# Patient Record
Sex: FEMALE | Race: WHITE | Employment: FULL TIME | URBAN - METROPOLITAN AREA
[De-identification: names, ages, dates, MRNs, and addresses within clinical notes are randomized per-mention and may not be internally consistent; named-entity substitution may affect disease eponyms.]

---

## 2018-07-02 ENCOUNTER — ANESTHESIA EVENT (OUTPATIENT)
Dept: MEDSURG UNIT | Age: 38
End: 2018-07-02
Payer: SELF-PAY

## 2018-07-02 NOTE — H&P
Nisreen Mail  : 1980  DOS: 2018  Chief Complaint: Consultation       Allergies: penicillin, Allegra, Benadryl       Medications: lisinopril, methocarbamol, miSOPROStol, montelukast, Claritin 10 mg oral tablet, venlafaxine, Synthroid, Iron 100 Plus oral tablet, biotin, Vitamin X74, folic acid, Maginex, PriLOSEC       Medical History: Height: 5' 8\", Weight: 142 lbs    Pulmonary System: Negative  Cardiac System: Hypertension  Blood and Liver Systems: Negative  Neurologic and Endocrine Systems: Thyroid Trouble  GI,  and Reproductive Systems: Negative  Cancer: Negative  Other: Arthritis     Surgical History: Gastric Bypass   x2  Cholecystectomy       Family History: Depression , Stroke , High Blood Pressure , Heart Disease       Social History: Smoking Status: Current every day smoker       Mrs. Scout Paniagua is a pleasant 81-year-old female who presents today for a consultation regarding her desire to undergo elective body contouring surgery to address changes that have occurred to her body from a history of obesity and laparoscopic gastric bypass surgery and marked weight loss. Her greatest weight is approximately 290 pounds. She underwent a laparoscopic gastric bypass in 2016 while living in [place]. She currently is down to approximately 140 pounds having lost 150 pounds. As a result of her marked weight loss she has seen changes that occurred throughout most of her body. She is interested today however in addressing the changes to her circumferential trunk. She is interested in a more youthful contour and profile of her anterior abdomen, her sides and her back and buttocks. Her children are ages 15 and 10 both delivered by . She has been pregnant 3 additional times all resulting in a miscarriage. She also has a history of a DVT following a knee operation and was treated briefly with Lovenox. Review of systems reveals normal heart and lung sounds. Examination demonstrates soft tissue tone and skin tone on her circumferential trunk consistent with a bariatric weight loss patient. She has laparoscopic scars consistent with her gastric bypass. She has an overhanging fold or pannus in the front with suprapubic mons fullness and soft tissue laxity that extends laterally over the greater trochanter and the hip region to the buttocks and lumbar region. She has buttock ptosis and poor tissue tone posteriorly. She also demonstrates evidence of scoliosis curving to her left of the chest wall and the right of the pelvis. I had a lengthy discussion with Mrs. Renny Patricia regarding post-bariatric body contouring to address her concerns. I believe she is a candidate for circumferential/belt abdominoplasty. She understands this is performed under a general anesthetic on an outpatient basis. I diagrammed on paper and her trunk where the incisions are made and she understands patients begin in the prone position. An incision is made across the lower lumbar region just at the apex of the vertical gluteal cleft and a large caudally based skin flap is elevated off of the gluteal fascia. A de-epithelialized Island of dermis and fat can be preserved on its vasculature and rotated down and sutured to the muscle fascia to provide added volume. Additional skin and soft tissue is excised and removed and the large flap is pulled in a cephalic direction and the large wound is closed in layers with dissolvable sutures. Suction drains are left in place and patients are then rotated to the supine position. The anterior component is completed similarly to a standard abdominoplasty with a transverse lower abdominal incision, an effort to contour the suprapubic mons region, and elevation to the costal margins and the xiphoid. The anterior rectus fascia is plicated and tightened with Prolene sutures.   Patients are then flexed at the waist and the excess skin and fat is incised and removed. Anteriorly the complex wound is closed in layers with dissolvable sutures and suction drains are also left in place. A compression garment is used with associated activity restrictions while she is healing which can extend beyond 6 weeks. There is no exercise for 4 weeks. The risks and complications include but are not limited to infection, pain, bleeding, hematomas requiring re-operation, skin sensitivity changes, vascular compromise to tissues resulting in partial or complete loss of tissues, resultant open wounds, the need for long term wound care and dressing changes and scarring, irregularities and asymmetries requiring touch-up and revision surgery, an unacceptable cosmetic result, and other things including cardiac and pulmonary risks that include death. In addition, her history of DVT following a knee operation would prompt me to treat her with Lovenox in the immediate postoperative period. Her questions were answered and pictures were taken. She will meet with Breann Kemp to discuss the fees.

## 2018-07-03 ENCOUNTER — HOSPITAL ENCOUNTER (OUTPATIENT)
Age: 38
Setting detail: OUTPATIENT SURGERY
Discharge: HOME | End: 2018-07-03
Attending: SURGERY | Admitting: SURGERY
Payer: SELF-PAY

## 2018-07-03 ENCOUNTER — ANESTHESIA (OUTPATIENT)
Dept: MEDSURG UNIT | Age: 38
End: 2018-07-03
Payer: SELF-PAY

## 2018-07-03 VITALS
TEMPERATURE: 97.4 F | BODY MASS INDEX: 21.95 KG/M2 | OXYGEN SATURATION: 100 % | WEIGHT: 144.84 LBS | HEART RATE: 92 BPM | HEIGHT: 68 IN | DIASTOLIC BLOOD PRESSURE: 80 MMHG | RESPIRATION RATE: 17 BRPM | SYSTOLIC BLOOD PRESSURE: 102 MMHG

## 2018-07-03 PROBLEM — Z86.39 HISTORY OF MORBID OBESITY: Status: ACTIVE | Noted: 2018-07-03

## 2018-07-03 PROBLEM — Z41.1 ENCOUNTER FOR COSMETIC SURGERY: Status: ACTIVE | Noted: 2018-07-03

## 2018-07-03 PROBLEM — R63.4 EXCESSIVE WEIGHT LOSS: Status: ACTIVE | Noted: 2018-07-03

## 2018-07-03 LAB
HCG UR QL: NEGATIVE
HGB BLD-MCNC: 12.7 G/DL (ref 11.5–16)

## 2018-07-03 PROCEDURE — 76030000011 HC AMB SURG OR TIME 5.5 TO 6: Performed by: SURGERY

## 2018-07-03 PROCEDURE — 74011250636 HC RX REV CODE- 250/636: Performed by: ANESTHESIOLOGY

## 2018-07-03 PROCEDURE — 74011250636 HC RX REV CODE- 250/636

## 2018-07-03 PROCEDURE — 77030031139 HC SUT VCRL2 J&J -A: Performed by: SURGERY

## 2018-07-03 PROCEDURE — 77030027138 HC INCENT SPIROMETER -A

## 2018-07-03 PROCEDURE — 77030018719 HC DRSG PTCH ANTIMIC J&J -A: Performed by: SURGERY

## 2018-07-03 PROCEDURE — 74011250636 HC RX REV CODE- 250/636: Performed by: SURGERY

## 2018-07-03 PROCEDURE — 74011000250 HC RX REV CODE- 250

## 2018-07-03 PROCEDURE — 77030013567 HC DRN WND RESERV BARD -A: Performed by: SURGERY

## 2018-07-03 PROCEDURE — 77030018846 HC SOL IRR STRL H20 ICUM -A: Performed by: SURGERY

## 2018-07-03 PROCEDURE — 77030020782 HC GWN BAIR PAWS FLX 3M -B

## 2018-07-03 PROCEDURE — 76210000038 HC AMBSU PH I REC 2.5 TO 3 HR: Performed by: SURGERY

## 2018-07-03 PROCEDURE — 77030011264 HC ELECTRD BLD EXT COVD -A: Performed by: SURGERY

## 2018-07-03 PROCEDURE — 77030020061 HC IV BLD WRMR ADMIN SET 3M -B: Performed by: ANESTHESIOLOGY

## 2018-07-03 PROCEDURE — 77030002966 HC SUT PDS J&J -A: Performed by: SURGERY

## 2018-07-03 PROCEDURE — 76060000072 HC AMB SURG ANES 5.5 TO 6 HR: Performed by: SURGERY

## 2018-07-03 PROCEDURE — 74011250637 HC RX REV CODE- 250/637

## 2018-07-03 PROCEDURE — 85018 HEMOGLOBIN: CPT

## 2018-07-03 PROCEDURE — 77030026438 HC STYL ET INTUB CARD -A: Performed by: ANESTHESIOLOGY

## 2018-07-03 PROCEDURE — 77030003666 HC NDL SPINAL BD -A: Performed by: SURGERY

## 2018-07-03 PROCEDURE — 77030018836 HC SOL IRR NACL ICUM -A: Performed by: SURGERY

## 2018-07-03 PROCEDURE — 77030002986 HC SUT PROL J&J -A: Performed by: SURGERY

## 2018-07-03 PROCEDURE — 74011000250 HC RX REV CODE- 250: Performed by: SURGERY

## 2018-07-03 PROCEDURE — 77030002996 HC SUT SLK J&J -A: Performed by: SURGERY

## 2018-07-03 PROCEDURE — 77030011640 HC PAD GRND REM COVD -A: Performed by: SURGERY

## 2018-07-03 PROCEDURE — 77030019908 HC STETH ESOPH SIMS -A: Performed by: ANESTHESIOLOGY

## 2018-07-03 PROCEDURE — 77030039267 HC ADH SKN EXOFIN S2SG -B: Performed by: SURGERY

## 2018-07-03 PROCEDURE — 81025 URINE PREGNANCY TEST: CPT

## 2018-07-03 PROCEDURE — 74011250637 HC RX REV CODE- 250/637: Performed by: ANESTHESIOLOGY

## 2018-07-03 PROCEDURE — 77030034850: Performed by: SURGERY

## 2018-07-03 PROCEDURE — 77030011265 HC ELECTRD BLD HEX COVD -A: Performed by: SURGERY

## 2018-07-03 PROCEDURE — C9290 INJ, BUPIVACAINE LIPOSOME: HCPCS | Performed by: SURGERY

## 2018-07-03 PROCEDURE — 77030032490 HC SLV COMPR SCD KNE COVD -B: Performed by: SURGERY

## 2018-07-03 PROCEDURE — 77030008684 HC TU ET CUF COVD -B: Performed by: ANESTHESIOLOGY

## 2018-07-03 PROCEDURE — 77030008467 HC STPLR SKN COVD -B: Performed by: SURGERY

## 2018-07-03 PROCEDURE — 77030002933 HC SUT MCRYL J&J -A: Performed by: SURGERY

## 2018-07-03 PROCEDURE — 77030012407 HC DRN WND BARD -B: Performed by: SURGERY

## 2018-07-03 PROCEDURE — 77030019702 HC WRP THER MENM -C: Performed by: SURGERY

## 2018-07-03 RX ORDER — FENTANYL CITRATE 50 UG/ML
50 INJECTION, SOLUTION INTRAMUSCULAR; INTRAVENOUS AS NEEDED
Status: DISCONTINUED | OUTPATIENT
Start: 2018-07-03 | End: 2018-07-03 | Stop reason: HOSPADM

## 2018-07-03 RX ORDER — SODIUM CHLORIDE, SODIUM LACTATE, POTASSIUM CHLORIDE, CALCIUM CHLORIDE 600; 310; 30; 20 MG/100ML; MG/100ML; MG/100ML; MG/100ML
INJECTION, SOLUTION INTRAVENOUS
Status: DISCONTINUED | OUTPATIENT
Start: 2018-07-03 | End: 2018-07-03 | Stop reason: HOSPADM

## 2018-07-03 RX ORDER — FENTANYL CITRATE 50 UG/ML
25 INJECTION, SOLUTION INTRAMUSCULAR; INTRAVENOUS
Status: DISCONTINUED | OUTPATIENT
Start: 2018-07-03 | End: 2018-07-03 | Stop reason: HOSPADM

## 2018-07-03 RX ORDER — SODIUM CHLORIDE, SODIUM LACTATE, POTASSIUM CHLORIDE, CALCIUM CHLORIDE 600; 310; 30; 20 MG/100ML; MG/100ML; MG/100ML; MG/100ML
125 INJECTION, SOLUTION INTRAVENOUS CONTINUOUS
Status: DISCONTINUED | OUTPATIENT
Start: 2018-07-03 | End: 2018-07-03 | Stop reason: HOSPADM

## 2018-07-03 RX ORDER — ONDANSETRON 2 MG/ML
INJECTION INTRAMUSCULAR; INTRAVENOUS AS NEEDED
Status: DISCONTINUED | OUTPATIENT
Start: 2018-07-03 | End: 2018-07-03 | Stop reason: HOSPADM

## 2018-07-03 RX ORDER — LEVOTHYROXINE SODIUM 100 UG/1
TABLET ORAL
COMMUNITY

## 2018-07-03 RX ORDER — SODIUM CHLORIDE 0.9 % (FLUSH) 0.9 %
5-10 SYRINGE (ML) INJECTION AS NEEDED
Status: DISCONTINUED | OUTPATIENT
Start: 2018-07-03 | End: 2018-07-03 | Stop reason: SDUPTHER

## 2018-07-03 RX ORDER — MORPHINE SULFATE 10 MG/ML
2 INJECTION, SOLUTION INTRAMUSCULAR; INTRAVENOUS
Status: DISCONTINUED | OUTPATIENT
Start: 2018-07-03 | End: 2018-07-03 | Stop reason: HOSPADM

## 2018-07-03 RX ORDER — SCOLOPAMINE TRANSDERMAL SYSTEM 1 MG/1
PATCH, EXTENDED RELEASE TRANSDERMAL AS NEEDED
Status: DISCONTINUED | OUTPATIENT
Start: 2018-07-03 | End: 2018-07-03 | Stop reason: HOSPADM

## 2018-07-03 RX ORDER — SODIUM CHLORIDE 0.9 % (FLUSH) 0.9 %
5-10 SYRINGE (ML) INJECTION EVERY 8 HOURS
Status: DISCONTINUED | OUTPATIENT
Start: 2018-07-03 | End: 2018-07-03 | Stop reason: SDUPTHER

## 2018-07-03 RX ORDER — SODIUM CHLORIDE 0.9 % (FLUSH) 0.9 %
5-10 SYRINGE (ML) INJECTION AS NEEDED
Status: DISCONTINUED | OUTPATIENT
Start: 2018-07-03 | End: 2018-07-03 | Stop reason: HOSPADM

## 2018-07-03 RX ORDER — OXYCODONE HYDROCHLORIDE 5 MG/1
5 TABLET ORAL AS NEEDED
Status: DISCONTINUED | OUTPATIENT
Start: 2018-07-03 | End: 2018-07-03 | Stop reason: HOSPADM

## 2018-07-03 RX ORDER — HYDROMORPHONE HYDROCHLORIDE 2 MG/ML
INJECTION, SOLUTION INTRAMUSCULAR; INTRAVENOUS; SUBCUTANEOUS AS NEEDED
Status: DISCONTINUED | OUTPATIENT
Start: 2018-07-03 | End: 2018-07-03 | Stop reason: HOSPADM

## 2018-07-03 RX ORDER — LISINOPRIL 5 MG/1
5 TABLET ORAL DAILY
COMMUNITY
End: 2019-09-06

## 2018-07-03 RX ORDER — NEOSTIGMINE METHYLSULFATE 1 MG/ML
INJECTION INTRAVENOUS AS NEEDED
Status: DISCONTINUED | OUTPATIENT
Start: 2018-07-03 | End: 2018-07-03 | Stop reason: HOSPADM

## 2018-07-03 RX ORDER — VENLAFAXINE HYDROCHLORIDE 75 MG/1
75 CAPSULE, EXTENDED RELEASE ORAL
COMMUNITY

## 2018-07-03 RX ORDER — LIDOCAINE HYDROCHLORIDE 20 MG/ML
INJECTION, SOLUTION EPIDURAL; INFILTRATION; INTRACAUDAL; PERINEURAL AS NEEDED
Status: DISCONTINUED | OUTPATIENT
Start: 2018-07-03 | End: 2018-07-03 | Stop reason: HOSPADM

## 2018-07-03 RX ORDER — CEFAZOLIN SODIUM 1 G/3ML
INJECTION, POWDER, FOR SOLUTION INTRAMUSCULAR; INTRAVENOUS AS NEEDED
Status: DISCONTINUED | OUTPATIENT
Start: 2018-07-03 | End: 2018-07-03 | Stop reason: HOSPADM

## 2018-07-03 RX ORDER — LANOLIN ALCOHOL/MO/W.PET/CERES
CREAM (GRAM) TOPICAL
COMMUNITY
End: 2019-09-06

## 2018-07-03 RX ORDER — MIDAZOLAM HYDROCHLORIDE 1 MG/ML
1 INJECTION, SOLUTION INTRAMUSCULAR; INTRAVENOUS
Status: DISCONTINUED | OUTPATIENT
Start: 2018-07-03 | End: 2018-07-03 | Stop reason: HOSPADM

## 2018-07-03 RX ORDER — MIDAZOLAM HYDROCHLORIDE 1 MG/ML
0.5 INJECTION, SOLUTION INTRAMUSCULAR; INTRAVENOUS
Status: DISCONTINUED | OUTPATIENT
Start: 2018-07-03 | End: 2018-07-03 | Stop reason: HOSPADM

## 2018-07-03 RX ORDER — FENTANYL CITRATE 50 UG/ML
INJECTION, SOLUTION INTRAMUSCULAR; INTRAVENOUS AS NEEDED
Status: DISCONTINUED | OUTPATIENT
Start: 2018-07-03 | End: 2018-07-03 | Stop reason: HOSPADM

## 2018-07-03 RX ORDER — ONDANSETRON 2 MG/ML
4 INJECTION INTRAMUSCULAR; INTRAVENOUS AS NEEDED
Status: DISCONTINUED | OUTPATIENT
Start: 2018-07-03 | End: 2018-07-03 | Stop reason: HOSPADM

## 2018-07-03 RX ORDER — BUPIVACAINE HYDROCHLORIDE 2.5 MG/ML
30 INJECTION, SOLUTION EPIDURAL; INFILTRATION; INTRACAUDAL ONCE
Status: COMPLETED | OUTPATIENT
Start: 2018-07-03 | End: 2018-07-03

## 2018-07-03 RX ORDER — LIDOCAINE HYDROCHLORIDE 10 MG/ML
0.5 INJECTION, SOLUTION EPIDURAL; INFILTRATION; INTRACAUDAL; PERINEURAL AS NEEDED
Status: DISCONTINUED | OUTPATIENT
Start: 2018-07-03 | End: 2018-07-03 | Stop reason: HOSPADM

## 2018-07-03 RX ORDER — OMEPRAZOLE 10 MG/1
20 CAPSULE, DELAYED RELEASE ORAL DAILY
COMMUNITY
End: 2020-06-12

## 2018-07-03 RX ORDER — LORATADINE 10 MG/1
10 TABLET ORAL
COMMUNITY
End: 2019-09-06

## 2018-07-03 RX ORDER — GLUCOSAMINE/CHONDR SU A SOD 750-600 MG
10000 TABLET ORAL
COMMUNITY
End: 2019-09-06

## 2018-07-03 RX ORDER — MIDAZOLAM HYDROCHLORIDE 1 MG/ML
INJECTION, SOLUTION INTRAMUSCULAR; INTRAVENOUS AS NEEDED
Status: DISCONTINUED | OUTPATIENT
Start: 2018-07-03 | End: 2018-07-03 | Stop reason: HOSPADM

## 2018-07-03 RX ORDER — OXYCODONE HYDROCHLORIDE 5 MG/1
5 TABLET ORAL ONCE
Status: COMPLETED | OUTPATIENT
Start: 2018-07-03 | End: 2018-07-03

## 2018-07-03 RX ORDER — MIDAZOLAM HYDROCHLORIDE 1 MG/ML
1 INJECTION, SOLUTION INTRAMUSCULAR; INTRAVENOUS AS NEEDED
Status: DISCONTINUED | OUTPATIENT
Start: 2018-07-03 | End: 2018-07-03 | Stop reason: SDUPTHER

## 2018-07-03 RX ORDER — PHENYLEPHRINE HCL IN 0.9% NACL 0.4MG/10ML
SYRINGE (ML) INTRAVENOUS AS NEEDED
Status: DISCONTINUED | OUTPATIENT
Start: 2018-07-03 | End: 2018-07-03 | Stop reason: HOSPADM

## 2018-07-03 RX ORDER — OXYCODONE AND ACETAMINOPHEN 5; 325 MG/1; MG/1
1 TABLET ORAL AS NEEDED
Status: DISCONTINUED | OUTPATIENT
Start: 2018-07-03 | End: 2018-07-03 | Stop reason: HOSPADM

## 2018-07-03 RX ORDER — ENOXAPARIN SODIUM 100 MG/ML
30 INJECTION SUBCUTANEOUS ONCE
Status: COMPLETED | OUTPATIENT
Start: 2018-07-03 | End: 2018-07-03

## 2018-07-03 RX ORDER — SUCCINYLCHOLINE CHLORIDE 20 MG/ML
INJECTION INTRAMUSCULAR; INTRAVENOUS AS NEEDED
Status: DISCONTINUED | OUTPATIENT
Start: 2018-07-03 | End: 2018-07-03 | Stop reason: HOSPADM

## 2018-07-03 RX ORDER — ROCURONIUM BROMIDE 10 MG/ML
INJECTION, SOLUTION INTRAVENOUS AS NEEDED
Status: DISCONTINUED | OUTPATIENT
Start: 2018-07-03 | End: 2018-07-03 | Stop reason: HOSPADM

## 2018-07-03 RX ORDER — PROPOFOL 10 MG/ML
INJECTION, EMULSION INTRAVENOUS AS NEEDED
Status: DISCONTINUED | OUTPATIENT
Start: 2018-07-03 | End: 2018-07-03 | Stop reason: HOSPADM

## 2018-07-03 RX ORDER — DEXTROSE, SODIUM CHLORIDE, SODIUM LACTATE, POTASSIUM CHLORIDE, AND CALCIUM CHLORIDE 5; .6; .31; .03; .02 G/100ML; G/100ML; G/100ML; G/100ML; G/100ML
125 INJECTION, SOLUTION INTRAVENOUS CONTINUOUS
Status: DISCONTINUED | OUTPATIENT
Start: 2018-07-03 | End: 2018-07-03 | Stop reason: HOSPADM

## 2018-07-03 RX ORDER — ZINC GLUCONATE 10 MG
LOZENGE ORAL
COMMUNITY
End: 2019-09-06

## 2018-07-03 RX ORDER — GLYCOPYRROLATE 0.2 MG/ML
INJECTION INTRAMUSCULAR; INTRAVENOUS AS NEEDED
Status: DISCONTINUED | OUTPATIENT
Start: 2018-07-03 | End: 2018-07-03 | Stop reason: HOSPADM

## 2018-07-03 RX ORDER — FENTANYL CITRATE 50 UG/ML
50 INJECTION, SOLUTION INTRAMUSCULAR; INTRAVENOUS AS NEEDED
Status: DISCONTINUED | OUTPATIENT
Start: 2018-07-03 | End: 2018-07-03 | Stop reason: SDUPTHER

## 2018-07-03 RX ORDER — SODIUM CHLORIDE 0.9 % (FLUSH) 0.9 %
5-10 SYRINGE (ML) INJECTION EVERY 8 HOURS
Status: DISCONTINUED | OUTPATIENT
Start: 2018-07-03 | End: 2018-07-03 | Stop reason: HOSPADM

## 2018-07-03 RX ORDER — MIDAZOLAM HYDROCHLORIDE 1 MG/ML
1 INJECTION, SOLUTION INTRAMUSCULAR; INTRAVENOUS AS NEEDED
Status: DISCONTINUED | OUTPATIENT
Start: 2018-07-03 | End: 2018-07-03 | Stop reason: HOSPADM

## 2018-07-03 RX ORDER — LANOLIN ALCOHOL/MO/W.PET/CERES
2500 CREAM (GRAM) TOPICAL DAILY
COMMUNITY
End: 2020-12-09 | Stop reason: ALTCHOICE

## 2018-07-03 RX ORDER — DEXAMETHASONE SODIUM PHOSPHATE 4 MG/ML
INJECTION, SOLUTION INTRA-ARTICULAR; INTRALESIONAL; INTRAMUSCULAR; INTRAVENOUS; SOFT TISSUE AS NEEDED
Status: DISCONTINUED | OUTPATIENT
Start: 2018-07-03 | End: 2018-07-03 | Stop reason: HOSPADM

## 2018-07-03 RX ORDER — SODIUM CHLORIDE 9 MG/ML
25 INJECTION, SOLUTION INTRAVENOUS CONTINUOUS
Status: DISCONTINUED | OUTPATIENT
Start: 2018-07-03 | End: 2018-07-03 | Stop reason: HOSPADM

## 2018-07-03 RX ORDER — CLINDAMYCIN PHOSPHATE 900 MG/50ML
900 INJECTION INTRAVENOUS ONCE
Status: DISCONTINUED | OUTPATIENT
Start: 2018-07-03 | End: 2018-07-03 | Stop reason: HOSPADM

## 2018-07-03 RX ORDER — SODIUM CHLORIDE, SODIUM LACTATE, POTASSIUM CHLORIDE, CALCIUM CHLORIDE 600; 310; 30; 20 MG/100ML; MG/100ML; MG/100ML; MG/100ML
125 INJECTION, SOLUTION INTRAVENOUS CONTINUOUS
Status: DISCONTINUED | OUTPATIENT
Start: 2018-07-03 | End: 2018-07-03 | Stop reason: SDUPTHER

## 2018-07-03 RX ORDER — ACETAMINOPHEN 10 MG/ML
INJECTION, SOLUTION INTRAVENOUS AS NEEDED
Status: DISCONTINUED | OUTPATIENT
Start: 2018-07-03 | End: 2018-07-03 | Stop reason: HOSPADM

## 2018-07-03 RX ORDER — FOLIC ACID 1 MG/1
TABLET ORAL DAILY
COMMUNITY
End: 2020-06-12

## 2018-07-03 RX ORDER — LIDOCAINE HYDROCHLORIDE 10 MG/ML
0.1 INJECTION, SOLUTION EPIDURAL; INFILTRATION; INTRACAUDAL; PERINEURAL AS NEEDED
Status: DISCONTINUED | OUTPATIENT
Start: 2018-07-03 | End: 2018-07-03 | Stop reason: SDUPTHER

## 2018-07-03 RX ORDER — MONTELUKAST SODIUM 10 MG/1
10 TABLET ORAL DAILY
COMMUNITY
End: 2019-09-06

## 2018-07-03 RX ORDER — DIPHENHYDRAMINE HYDROCHLORIDE 50 MG/ML
12.5 INJECTION, SOLUTION INTRAMUSCULAR; INTRAVENOUS AS NEEDED
Status: DISCONTINUED | OUTPATIENT
Start: 2018-07-03 | End: 2018-07-03 | Stop reason: HOSPADM

## 2018-07-03 RX ADMIN — CEFAZOLIN SODIUM 1 G: 1 INJECTION, POWDER, FOR SOLUTION INTRAMUSCULAR; INTRAVENOUS at 16:56

## 2018-07-03 RX ADMIN — FENTANYL CITRATE 100 MCG: 50 INJECTION, SOLUTION INTRAMUSCULAR; INTRAVENOUS at 14:42

## 2018-07-03 RX ADMIN — ROCURONIUM BROMIDE 20 MG: 10 INJECTION, SOLUTION INTRAVENOUS at 14:08

## 2018-07-03 RX ADMIN — NEOSTIGMINE METHYLSULFATE 3 MG: 1 INJECTION INTRAVENOUS at 16:23

## 2018-07-03 RX ADMIN — SODIUM CHLORIDE, SODIUM LACTATE, POTASSIUM CHLORIDE, AND CALCIUM CHLORIDE 125 ML/HR: 600; 310; 30; 20 INJECTION, SOLUTION INTRAVENOUS at 10:30

## 2018-07-03 RX ADMIN — Medication 80 MCG: at 15:25

## 2018-07-03 RX ADMIN — FENTANYL CITRATE 25 MCG: 50 INJECTION, SOLUTION INTRAMUSCULAR; INTRAVENOUS at 17:48

## 2018-07-03 RX ADMIN — SODIUM CHLORIDE, SODIUM LACTATE, POTASSIUM CHLORIDE, CALCIUM CHLORIDE: 600; 310; 30; 20 INJECTION, SOLUTION INTRAVENOUS at 10:56

## 2018-07-03 RX ADMIN — ROCURONIUM BROMIDE 20 MG: 10 INJECTION, SOLUTION INTRAVENOUS at 14:34

## 2018-07-03 RX ADMIN — ROCURONIUM BROMIDE 20 MG: 10 INJECTION, SOLUTION INTRAVENOUS at 13:08

## 2018-07-03 RX ADMIN — SCOLOPAMINE TRANSDERMAL SYSTEM 1 PATCH: 1 PATCH, EXTENDED RELEASE TRANSDERMAL at 10:55

## 2018-07-03 RX ADMIN — DEXAMETHASONE SODIUM PHOSPHATE 4 MG: 4 INJECTION, SOLUTION INTRA-ARTICULAR; INTRALESIONAL; INTRAMUSCULAR; INTRAVENOUS; SOFT TISSUE at 14:04

## 2018-07-03 RX ADMIN — SUCCINYLCHOLINE CHLORIDE 120 MG: 20 INJECTION INTRAMUSCULAR; INTRAVENOUS at 11:06

## 2018-07-03 RX ADMIN — ENOXAPARIN SODIUM 30 MG: 30 INJECTION SUBCUTANEOUS at 17:30

## 2018-07-03 RX ADMIN — MIDAZOLAM HYDROCHLORIDE 2 MG: 1 INJECTION, SOLUTION INTRAMUSCULAR; INTRAVENOUS at 10:59

## 2018-07-03 RX ADMIN — CLINDAMYCIN PHOSPHATE 900 MG: 18 INJECTION, SOLUTION INTRAMUSCULAR; INTRAVENOUS at 11:15

## 2018-07-03 RX ADMIN — NEOSTIGMINE METHYLSULFATE 1 MG: 1 INJECTION INTRAVENOUS at 16:25

## 2018-07-03 RX ADMIN — ACETAMINOPHEN 1000 MG: 10 INJECTION, SOLUTION INTRAVENOUS at 11:15

## 2018-07-03 RX ADMIN — Medication 40 MCG: at 12:13

## 2018-07-03 RX ADMIN — Medication 80 MCG: at 12:15

## 2018-07-03 RX ADMIN — SODIUM CHLORIDE, SODIUM LACTATE, POTASSIUM CHLORIDE, CALCIUM CHLORIDE: 600; 310; 30; 20 INJECTION, SOLUTION INTRAVENOUS at 16:18

## 2018-07-03 RX ADMIN — PROPOFOL 200 MG: 10 INJECTION, EMULSION INTRAVENOUS at 11:06

## 2018-07-03 RX ADMIN — GLYCOPYRROLATE 0.4 MG: 0.2 INJECTION INTRAMUSCULAR; INTRAVENOUS at 16:23

## 2018-07-03 RX ADMIN — SODIUM CHLORIDE, SODIUM LACTATE, POTASSIUM CHLORIDE, AND CALCIUM CHLORIDE 125 ML/HR: 600; 310; 30; 20 INJECTION, SOLUTION INTRAVENOUS at 17:51

## 2018-07-03 RX ADMIN — GLYCOPYRROLATE 0.2 MG: 0.2 INJECTION INTRAMUSCULAR; INTRAVENOUS at 15:33

## 2018-07-03 RX ADMIN — FENTANYL CITRATE 75 MCG: 50 INJECTION, SOLUTION INTRAMUSCULAR; INTRAVENOUS at 11:12

## 2018-07-03 RX ADMIN — DEXAMETHASONE SODIUM PHOSPHATE 4 MG: 4 INJECTION, SOLUTION INTRA-ARTICULAR; INTRALESIONAL; INTRAMUSCULAR; INTRAVENOUS; SOFT TISSUE at 11:15

## 2018-07-03 RX ADMIN — FENTANYL CITRATE 50 MCG: 50 INJECTION, SOLUTION INTRAMUSCULAR; INTRAVENOUS at 11:06

## 2018-07-03 RX ADMIN — HYDROMORPHONE HYDROCHLORIDE 0.5 MG: 2 INJECTION, SOLUTION INTRAMUSCULAR; INTRAVENOUS; SUBCUTANEOUS at 16:50

## 2018-07-03 RX ADMIN — OXYCODONE HYDROCHLORIDE 5 MG: 5 TABLET ORAL at 18:51

## 2018-07-03 RX ADMIN — ROCURONIUM BROMIDE 10 MG: 10 INJECTION, SOLUTION INTRAVENOUS at 11:06

## 2018-07-03 RX ADMIN — LIDOCAINE HYDROCHLORIDE 100 MG: 20 INJECTION, SOLUTION EPIDURAL; INFILTRATION; INTRACAUDAL; PERINEURAL at 11:06

## 2018-07-03 RX ADMIN — ONDANSETRON 4 MG: 2 INJECTION INTRAMUSCULAR; INTRAVENOUS at 16:17

## 2018-07-03 RX ADMIN — Medication 80 MCG: at 13:25

## 2018-07-03 RX ADMIN — ROCURONIUM BROMIDE 30 MG: 10 INJECTION, SOLUTION INTRAVENOUS at 11:10

## 2018-07-03 RX ADMIN — FENTANYL CITRATE 75 MCG: 50 INJECTION, SOLUTION INTRAMUSCULAR; INTRAVENOUS at 12:44

## 2018-07-03 RX ADMIN — ROCURONIUM BROMIDE 40 MG: 10 INJECTION, SOLUTION INTRAVENOUS at 12:08

## 2018-07-03 NOTE — PERIOP NOTES
1755:   liter of LR in via warmer since admit to PACU. Color improving, urine output picking up. Lovenox 30 mg given per Dr Emili Riley in outer left thigh deep subq.

## 2018-07-03 NOTE — INTERVAL H&P NOTE
H&P Update:  Cuco Burgos was seen and examined. History and physical has been reviewed. The patient has been examined.  There have been no significant clinical changes since the completion of the originally dated History and Physical.    Signed By: Zo Nation MD     July 3, 2018 6:14 AM

## 2018-07-03 NOTE — OP NOTES
[address] [place] [address] REPORT    Darren Mac  MR#: [id number]  : 1980  ACCOUNT #: [de-identified]   DATE OF SERVICE: 2018    PREOPERATIVE DIAGNOSIS:  Personal history of morbid obesity, status post marked weight loss desires elective body contouring surgery. POSTOPERATIVE DIAGNOSIS:  Personal history of morbid obesity, status post marked weight loss desires elective body contouring surgery. PROCEDURES PERFORMED:  Circumferential/belt abdominoplasty with autologous augmentation to the buttocks. SURGEON:  Saleem Cordon MD     ANESTHESIA:  General.    INDICATION FOR PROCEDURE:  The patient is a pleasant 69-year-old female seen in the [address] [place] [address] with a desire to undergo elective body contouring surgery following marked weight loss. She gives a history of morbid obesity and was as heavy as 290 pounds. She underwent a laparoscopic Binta-en-Y gastric bypass in 2016 in [facility]. She has lost 150 pounds and is now down to approximately 140 pounds. As a result of her marked weight loss, she has seen significant changes and poor tissue tone and laxity to her circumferential trunk. She was felt to be an excellent candidate to treat these post-bariatric body issues by means of a circumferential/belt abdominoplasty, and she comes in today for that procedure. She does have a history of a DVT following knee operation and was treated briefly with Lovenox. We plan to treat her in the postoperative period with Lovenox prior to discharge. OPERATIVE PROCEDURE:  After appropriate consent was obtained, preoperative markings were placed. She was taken to the operating room and placed on the operating table in supine position. Satisfactory general endotracheal anesthesia was obtained. SCD devices were placed per routine and a Paul catheter was placed.   She was then rotated and placed in the prone position with appropriate padding and support and her posterior trunk and buttocks were sterilely prepped and draped. Based on our preoperative markings, the lower incision was made with a #10 scalpel blade beginning on the patient's right side and then electrocautery was used to dissect through the subcutaneous tissue to the gluteal fascia. We then dissected in a caudal direction towards the transverse gluteal fold on top of the gluteal fascia. We then pulled the skin flap in a cephalic direction to ensure wound closure prior to commitment to the upper incision. Once we were satisfied, the upper incision was made again down to the subcutaneous fat with the electrocautery. A 13 cm round island of deepithelialized dermis and subcutaneous fat left attached to the gluteal vasculature was then loosened from the surrounding connections, and rotated down to the appropriate position and sutured onto the gluteal fascia as for autologous augmentation. This was performed with 0 PDS sutures interrupted simple fashion. At the completion of the autologous augmentation, the skin flap was then pulled in a cephalad direction of both sides and the wound was closed in a complex fashion, 3 layers, reapproximating the deep fascia with a 0 PDS, the superficial fascia and dermis with a 2-0 PDS, and the skin with a 2-0 Monocryl. A 15-Citizen of [place] round fluted drain was brought out laterally on the upper thigh and secured with a 3-0 silk suture. A sterile dressing was placed on the posterior incision. The drapes were removed and she was placed back to a transfer gurney in supine position, and then back on the operative table in the supine position with her arms externally rotated and abducted on padded arm boards. The anterior trunk was sterilely prepped and draped and we completed the anterior component of the belt abdominoplasty communicating the incisions from the lateral upper thigh across the suprapubic mons region.   We then dissected in a cephalad direction to the xiphoid in the midline and the costal margins bilaterally. At this point, the abdominal rectus fascia was then plicated in standard fashion with 0 Prolene suture in 2 layers running fashion from the symphysis pubis to the umbilicus and 2 layers from the umbilicus to the xiphoid. The total with the plication at the level of the umbilicus was approximately 6.5 cm on either side of the midline. At this point, 0.25% Marcaine plain x30 mL was directly injected into the anterior rectus fascia to aid in postop pain management. The patient was then flexed at the waist to identify the amount of skin to be removed. It was marked with a marking pen and inspected for symmetry, incised with a #10 scalpel blade and removed with the electrocautery. Two additional 15-[place] round fluted drains were brought out on the upper lateral thigh, placed in the anterior abdominal wound, also secured with 3-0 silk suture. The abdominal wound was closed in 3 layers reapproximating Katie fascia with 2-0 PDS, the dermis with 3-0 Vicryl, and the skin with a 3-0 Monocryl. The umbilicus was brought out through its new position and secured in 2 layers with 3-0 Vicryl and 4-0 Monocryl suture. Sterile dressings and a compression garment binder were placed. At the end of the procedure, sponge and needle counts were reported as correct. ESTIMATED BLOOD LOSS:  250 mL. IV FLUIDS:  3000 mL. URINE OUTPUT IN THE NULL:  250 mL. SPECIMENS REMOVED:  Skin posterior trunk 489 g, discarded. Anterior trunk 812 g, discarded. IMPLANTS:  None. DRAINS:  15-[place] round fluted drains x4. ASSISTANT:  Crystal Jolley and [facility] staff. OR staff room 3. COMPLICATIONS:  None. She was awakened, extubated, and transferred to the recovery room in satisfactory and stable condition. She will be discharged home today in the care of her family with prescriptions and instructions and a followup with me within 1 week.       Candy Swanson MD Julian Boateng  D: 07/03/2018 17:05     T: 07/03/2018 17:39  JOB #: [id number]  CC: Elisa Matias MD

## 2018-07-03 NOTE — IP AVS SNAPSHOT
[address] [facility] [address] 
[phone] Patient: Anel Good MRN: [id number] [id number] About your hospitalization You were admitted on:  July 3, 2018 You last received care in the:  [facility] PACU You were discharged on:  July 3, 2018 Why you were hospitalized Your primary diagnosis was:  Encounter For Cosmetic Surgery Your diagnoses also included:  History Of Morbid Obesity, Excessive Weight Loss Follow-up Information Follow up With Details Comments Contact Info Sebas Solorio MD Follow up in 1 week(s)  [facility] [address] 
[phone] Zbigniew Persaud, MD   Patient can only remember the practice name and not the physician Discharge Orders None A check isis indicates which time of day the medication should be taken. My Medications CONTINUE taking these medications Instructions Each Dose to Equal  
 Morning Noon Evening Bedtime Biotin 2,500 mcg Cap Your last dose was: Your next dose is: Take 10,000 mcg by mouth. 39026 mcg CLARITIN 10 mg tablet Generic drug:  loratadine Your last dose was: Your next dose is: Take 10 mg by mouth. 10 mg  
    
   
   
   
  
 cyanocobalamin 1,000 mcg tablet Your last dose was: Your next dose is: Take 2,500 mcg by mouth daily. 2500 mcg EFFEXOR XR 75 mg capsule Generic drug:  venlafaxine-SR Your last dose was: Your next dose is: Take 75 mg by mouth daily. 75 mg  
    
   
   
   
  
 folic acid 1 mg tablet Commonly known as:  [employer] Your last dose was: Your next dose is: Take  by mouth daily. Iron 325 mg (65 mg iron) tablet Generic drug:  ferrous sulfate Your last dose was:     
   
Your next dose is: Take  by mouth Daily (before breakfast). lisinopril 5 mg tablet Commonly known as:  Vivien Frias Your last dose was: Your next dose is: Take 5 mg by mouth daily. 5 mg  
    
   
   
   
  
 magnesium 250 mg Tab Your last dose was: Your next dose is: Take  by mouth. OTHER Your last dose was: Your next dose is:    
   
   
 misoprostol PriLOSEC 10 mg capsule Generic drug:  omeprazole Your last dose was: Your next dose is: Take 10 mg by mouth daily. 10 mg  
    
   
   
   
  
 SINGULAIR 10 mg tablet Generic drug:  montelukast  
   
Your last dose was: Your next dose is: Take 10 mg by mouth daily. 10 mg SYNTHROID 100 mcg tablet Generic drug:  levothyroxine Your last dose was: Your next dose is: Take  by mouth Daily (before breakfast). Discharge Instructions Leave surgical garment and dressings ON and DRY for 48 hours, then may remove for shower. Resume any and all pre op medications and diet BUT use sport drinks like gator aid or power aid instead of water for 2-3 days and extra protein in diet helps wounds heal. 
Do not take pain medications on an empty stomach Keep head elevated at night at least 35 degrees, do not lay flat for about 2 weeks Walk every 1-2 hours during the day in house for 5-10 minutes for first 2 weeks Use the incentive spirometer every hour at least 5 times for first 2 weeks to help prevent respiratory problems NO direct sitting on butt for first 2 days except on the toilet, and then limit sitting to one hour for first 2 weeks You may slump sit like in a recliner chair, lay in the fetal position on your sides, and stand or walk. Use stool softeners to prevent constipation Clair Dc drains (4) measure and record daily output, recharge as needed, strip tubes 2-3 times per day When you shower in 48 hours, remove the garment and suspend the drains around your neck with a piece of string, yarn, or a lanyard, Shower as you normally would, dry your skin and place antibiotic ointment of choice over all incisions with clean gauze, and place the second clean binder on as you found the first one. Keep the drain tubes coming out the bottom of the binder component instead of out the top. May repeat daily and launder the garments in between use. Call Dr. Louis Hendricks on the cell number of [phone] Anticipate a phone call tonight from Dr. Louis Hendricks Scopolamine (Absorbed through the skin) Scopolamine ([id number]) Treat nausea and vomiting. Brand Name(s): Transderm Scop There may be other brand names for this medicine. When This Medicine Should Not Be Used: You should not use this medicine if you have had an allergic reaction to scopolamine, or if you have narrow angle glaucoma. How to Use This Medicine:  
Patch · If the patch is loose or falls off, apply a new patch at a different place behind the ear. · After you take off the patch, wash the place where the patch was and your hands thoroughly. · Only one patch should be used at any time. How to Store and Dispose of This Medicine: · Store the patches at room temperature in a closed container, away from heat, moisture, and direct light. · Fold the used patch in half with the sticky sides together. Throw any used patch away so that children or pets cannot get to it. You will also need to throw away old patches after the expiration date has passed. · Keep all medicine out of the reach of children. Never share your medicine with anyone. Drugs and Foods to Avoid: Ask your doctor or pharmacist before using any other medicine, including over-the-counter medicines, vitamins, and herbal products.  
· Tell your doctor if you use anything else that makes you sleepy. Some examples are allergy medicine, narcotic pain medicine, and alcohol. · Do not drink alcohol while you are using this medicine. Warnings While Using This Medicine: · Make sure your doctor knows if you are pregnant or breastfeeding, or if you have glaucoma, prostate problems, trouble urinating, blocked bowels, liver disease, kidney disease, or a history of seizures or mental illness. · This medicine can cause blurring of vision and other vision problems if it comes in contact with the eyes. This medicine may also cause problems with urination. If any of these reactions occur, remove the patch and call your doctor right away. · This medicine may make you dizzy or drowsy. Avoid driving, using machines, or doing anything else that could be dangerous if you are not alert. If you plan to participate in underwater sports, this medicine may cause disorienting effects. If this is a concern for you, talk with your doctor. · This medicine may make you sweat less and cause your body to get too hot. Be careful in hot weather, when you are exercising, or if using a sauna or whirlpool. · Tell any doctor or dentist who treats you that you are using this medicine. This medicine may affect certain medical test results. · Skin burns have been reported at the patch site in several patients wearing an aluminized transdermal system during a magnetic resonance imaging scan (MRI). Because Transderm Sc?p® contains aluminum, it is recommended to remove the system before undergoing an MRI. Possible Side Effects While Using This Medicine:  
Call your doctor right away if you notice any of these side effects: · Allergic reaction: Itching or hives, swelling in your face or hands, swelling or tingling in your mouth or throat, chest tightness, trouble breathing · Blurred vision. · Confusion or memory loss. · Fast, slow, or uneven heartbeat.  
· Lightheadedness, dizziness, drowsiness, or fainting. · Seeing, hearing, or feeling things that are not there. · Severe eye pain. · Trouble urinating. If you notice these less serious side effects, talk with your doctor: · Dry mouth. · Dry, itchy, or red eyes. · Restlessness. · Skin rash or redness. If you notice other side effects that you think are caused by this medicine, tell your doctor. DISCHARGE SUMMARY from Nurse PATIENT INSTRUCTIONS: 
 
 
Read insert on Experell After general anesthesia or intravenous sedation, for 24 hours or while taking prescription Narcotics: · Limit your activities · Do not drive and operate hazardous machinery · Do not make important personal or business decisions · Do  not drink alcoholic beverages · If you have not urinated within 8 hours after discharge, please contact your surgeon on call. Report the following to your surgeon: 
· Excessive pain, swelling, redness or odor of or around the surgical area · Temperature over 100.5 · Nausea and vomiting lasting longer than 4 hours or if unable to take medications · Any signs of decreased circulation or nerve impairment to extremity: change in color, persistent  numbness, tingling, coldness or increase pain · Any questions What to do at Home: 
Recommended activity: Activity as tolerated and no driving for today. If you experience any of the symptoms, please follow up with Dr Gerardo Mcgovern. *  Please give a list of your current medications to your Primary Care Provider. *  Please update this list whenever your medications are discontinued, doses are 
    changed, or new medications (including over-the-counter products) are added. *  Please carry medication information at all times in case of emergency situations. These are general instructions for a healthy lifestyle: No smoking/ No tobacco products/ Avoid exposure to second hand smoke Surgeon General's Warning:  Quitting smoking now greatly reduces serious risk to your health. Obesity, smoking, and sedentary lifestyle greatly increases your risk for illness A healthy diet, regular physical exercise & weight monitoring are important for maintaining a healthy lifestyle You may be retaining fluid if you have a history of heart failure or if you experience any of the following symptoms:  Weight gain of 3 pounds or more overnight or 5 pounds in a week, increased swelling in our hands or feet or shortness of breath while lying flat in bed. Please call your doctor as soon as you notice any of these symptoms; do not wait until your next office visit. Recognize signs and symptoms of STROKE: 
 
F-face looks uneven A-arms unable to move or move unevenly S-speech slurred or non-existent T-time-call 911 as soon as signs and symptoms begin-DO NOT go Back to bed or wait to see if you get better-TIME IS BRAIN. Warning Signs of HEART ATTACK Call 911 if you have these symptoms:  Chest discomfort. Most heart attacks involve discomfort in the center of the chest that lasts more than a few minutes, or that goes away and comes back. It can feel like uncomfortable pressure, squeezing, fullness, or pain.  Discomfort in other areas of the upper body. Symptoms can include pain or discomfort in one or both arms, the back, neck, jaw, or stomach.  Shortness of breath with or without chest discomfort.  Other signs may include breaking out in a cold sweat, nausea, or lightheadedness. Don't wait more than five minutes to call [address]! Fast action can save your life. Calling 911 is almost always the fastest way to get lifesaving treatment. Emergency Medical Services staff can begin treatment when they arrive  up to an hour sooner than if someone gets to the hospital by car. The discharge information has been reviewed with the patient and spouse. The patient and spouse verbalized understanding.  
Discharge medications reviewed with the patient and spouse and appropriate educational materials and side effects teaching were provided. ___________________________________________________________________________________________________________________________________ Introducing [facility] [place] [facility]! Alvarado Jones introduces Viewpoint patient portal. Now you can access parts of your medical record, email your doctor's office, and request medication refills online. 1. In your internet browser, go to [email or web address] 2. Click on the First Time User? Click Here link in the Sign In box. You will see the New Member Sign Up page. 3. Enter your Viewpoint Access Code exactly as it appears below. You will not need to use this code after youve completed the sign-up process. If you do not sign up before the expiration date, you must request a new code. · Viewpoint Access Code: [email or web address] Expires: 9/27/2018  1:24 PM 
 
4. Enter the last four digits of your Social Security Number (xxxx) and Date of Birth (mm/dd/yyyy) as indicated and click Submit. You will be taken to the next sign-up page. 5. Create a IVDeskt ID. This will be your Viewpoint login ID and cannot be changed, so think of one that is secure and easy to remember. 6. Create a Viewpoint password. You can change your password at any time. 7. Enter your Password Reset Question and Answer. This can be used at a later time if you forget your password. 8. Enter your e-mail address. You will receive e-mail notification when new information is available in [address] 9. Click Sign Up. You can now view and download portions of your medical record. 10. Click the Download Summary menu link to download a portable copy of your medical information. If you have questions, please visit the Frequently Asked Questions section of the Viewpoint website. Remember, Viewpoint is NOT to be used for urgent needs. For medical emergencies, dial 911. Now available from your iPhone and Android! Introducing Dion Booker As a [facility] patient, I wanted to make you aware of our electronic visit tool called Dion Booker. its learning allows you to connect within minutes with a medical provider 24 hours a day, seven days a week via a mobile device or tablet or logging into a secure website from your computer. You can access Dion Booker from anywhere in the [place]. A virtual visit might be right for you when you have a simple condition and feel like you just dont want to get out of bed, or cant get away from work for an appointment, when your regular [facility] provider is not available (evenings, weekends or holidays), or when youre out of town and need minor care. Electronic visits cost only $49 and if the [employer] provider determines a prescription is needed to treat your condition, one can be electronically transmitted to a nearby pharmacy*. Please take a moment to enroll today if you have not already done so. The enrollment process is free and takes just a few minutes. To enroll, please download the its learning adrianna to your tablet or phone, or visit [email or web address] to enroll on your computer. And, as an [address] [place] [address] patient with a [employer] account, the results of your visits will be scanned into your electronic medical record and your primary care provider will be able to view the scanned results. We urge you to continue to see your regular [facility] provider for your ongoing medical care. And while your primary care provider may not be the one available when you seek a Dion Booker virtual visit, the peace of mind you get from getting a real diagnosis real time can be priceless. For more information on Dion Booker, view our Frequently Asked Questions (FAQs) at [email or web address]. Sincerely, 
 
Kimberlyn Martins MD 
Chief Medical Officer [employer] *:  certain medications cannot be prescribed via Dion Booker Providers Seen During Your Hospitalization Provider Specialty Primary office phone [facility]  Surgery [phone] Your Primary Care Physician (PCP) Primary Care Physician Office Phone Office Fax OTHER, PHYS ** None ** ** None ** You are allergic to the following Allergen Reactions Allegra (Fexofenadine) Shortness of Breath Amoxicillin Other (comments) Yeast infection Benadryl (Diphenhydramine Hcl) Shortness of Breath Recent Documentation Height Weight BMI OB Status Smoking Status 1.727 m 65.7 kg 22.02 kg/m2 IUD Current Every Day Smoker Emergency Contacts Name Discharge Info Relation Home Work Mobile [address] CAREGIVER [3] Spouse [3]   [phone] Patient Belongings The following personal items are in your possession at time of discharge: 
  Dental Appliances: None                Clothing:  (clothes in locker valuable to ) Please provide this summary of care documentation to your next provider. Signatures-by signing, you are acknowledging that this After Visit Summary has been reviewed with you and you have received a copy. Patient Signature:  ____________________________________________________________ Date:  ____________________________________________________________  
  
Teresita Jones Provider Signature:  ____________________________________________________________ Date:  ____________________________________________________________

## 2018-07-03 NOTE — DISCHARGE INSTRUCTIONS
Leave surgical garment and dressings ON and DRY for 48 hours, then may remove for shower. Resume any and all pre op medications and diet BUT use sport drinks like gator aid or power aid instead of water for 2-3 days and extra protein in diet helps wounds heal.  Do not take pain medications on an empty stomach  Keep head elevated at night at least 35 degrees, do not lay flat for about 2 weeks  Walk every 1-2 hours during the day in house for 5-10 minutes for first 2 weeks  Use the incentive spirometer every hour at least 5 times for first 2 weeks to help prevent respiratory problems  NO direct sitting on butt for first 2 days except on the toilet, and then limit sitting to one hour for first 2 weeks  You may slump sit like in a recliner chair, lay in the fetal position on your sides, and stand or walk. Use stool softeners to prevent constipation  MELISSA drains (4) measure and record daily output, recharge as needed, strip tubes 2-3 times per day  When you shower in 48 hours, remove the garment and suspend the drains around your neck with a piece of string, yarn, or a lanyard,  Shower as you normally would, dry your skin and place antibiotic ointment of choice over all incisions with clean gauze, and place the second clean binder on as you found the first one. Keep the drain tubes coming out the bottom of the binder component instead of out the top. May repeat daily and launder the garments in between use. Call Dr. Alexia Metz on the cell number of [phone]  Anticipate a phone call tonight from Dr. Alexia Metz    Scopolamine (Absorbed through the skin)   Scopolamine ([id number])  Treat nausea and vomiting. Brand Name(s): Transderm Scop   There may be other brand names for this medicine. When This Medicine Should Not Be Used: You should not use this medicine if you have had an allergic reaction to scopolamine, or if you have narrow angle glaucoma.   How to Use This Medicine:   Patch    · If the patch is loose or falls off, apply a new patch at a different place behind the ear. · After you take off the patch, wash the place where the patch was and your hands thoroughly. · Only one patch should be used at any time. How to Store and Dispose of This Medicine:   · Store the patches at room temperature in a closed container, away from heat, moisture, and direct light. · Fold the used patch in half with the sticky sides together. Throw any used patch away so that children or pets cannot get to it. You will also need to throw away old patches after the expiration date has passed. · Keep all medicine out of the reach of children. Never share your medicine with anyone. Drugs and Foods to Avoid:   Ask your doctor or pharmacist before using any other medicine, including over-the-counter medicines, vitamins, and herbal products. · Tell your doctor if you use anything else that makes you sleepy. Some examples are allergy medicine, narcotic pain medicine, and alcohol. · Do not drink alcohol while you are using this medicine. Warnings While Using This Medicine:   · Make sure your doctor knows if you are pregnant or breastfeeding, or if you have glaucoma, prostate problems, trouble urinating, blocked bowels, liver disease, kidney disease, or a history of seizures or mental illness. · This medicine can cause blurring of vision and other vision problems if it comes in contact with the eyes. This medicine may also cause problems with urination. If any of these reactions occur, remove the patch and call your doctor right away. · This medicine may make you dizzy or drowsy. Avoid driving, using machines, or doing anything else that could be dangerous if you are not alert. If you plan to participate in underwater sports, this medicine may cause disorienting effects. If this is a concern for you, talk with your doctor. · This medicine may make you sweat less and cause your body to get too hot.  Be careful in hot weather, when you are exercising, or if using a sauna or whirlpool. · Tell any doctor or dentist who treats you that you are using this medicine. This medicine may affect certain medical test results. · Skin burns have been reported at the patch site in several patients wearing an aluminized transdermal system during a magnetic resonance imaging scan (MRI). Because Transderm Sc?p® contains aluminum, it is recommended to remove the system before undergoing an MRI. Possible Side Effects While Using This Medicine:   Call your doctor right away if you notice any of these side effects:  · Allergic reaction: Itching or hives, swelling in your face or hands, swelling or tingling in your mouth or throat, chest tightness, trouble breathing  · Blurred vision. · Confusion or memory loss. · Fast, slow, or uneven heartbeat. · Lightheadedness, dizziness, drowsiness, or fainting. · Seeing, hearing, or feeling things that are not there. · Severe eye pain. · Trouble urinating. If you notice these less serious side effects, talk with your doctor:   · Dry mouth. · Dry, itchy, or red eyes. · Restlessness. · Skin rash or redness. If you notice other side effects that you think are caused by this medicine, tell your doctor. DISCHARGE SUMMARY from Nurse    PATIENT INSTRUCTIONS:      Read insert on [employer]rell      After general anesthesia or intravenous sedation, for 24 hours or while taking prescription Narcotics:  · Limit your activities  · Do not drive and operate hazardous machinery  · Do not make important personal or business decisions  · Do  not drink alcoholic beverages  · If you have not urinated within 8 hours after discharge, please contact your surgeon on call.     Report the following to your surgeon:  · Excessive pain, swelling, redness or odor of or around the surgical area  · Temperature over 100.5  · Nausea and vomiting lasting longer than 4 hours or if unable to take medications  · Any signs of decreased circulation or nerve impairment to extremity: change in color, persistent  numbness, tingling, coldness or increase pain  · Any questions    What to do at Home:  Recommended activity: Activity as tolerated and no driving for today. If you experience any of the symptoms, please follow up with Dr Kate Plaza. *  Please give a list of your current medications to your Primary Care Provider. *  Please update this list whenever your medications are discontinued, doses are      changed, or new medications (including over-the-counter products) are added. *  Please carry medication information at all times in case of emergency situations. These are general instructions for a healthy lifestyle:    No smoking/ No tobacco products/ Avoid exposure to second hand smoke  Surgeon General's Warning:  Quitting smoking now greatly reduces serious risk to your health. Obesity, smoking, and sedentary lifestyle greatly increases your risk for illness    A healthy diet, regular physical exercise & weight monitoring are important for maintaining a healthy lifestyle    You may be retaining fluid if you have a history of heart failure or if you experience any of the following symptoms:  Weight gain of 3 pounds or more overnight or 5 pounds in a week, increased swelling in our hands or feet or shortness of breath while lying flat in bed. Please call your doctor as soon as you notice any of these symptoms; do not wait until your next office visit. Recognize signs and symptoms of STROKE:    F-face looks uneven    A-arms unable to move or move unevenly    S-speech slurred or non-existent    T-time-call 911 as soon as signs and symptoms begin-DO NOT go       Back to bed or wait to see if you get better-TIME IS BRAIN. Warning Signs of HEART ATTACK     Call 911 if you have these symptoms:   Chest discomfort. Most heart attacks involve discomfort in the center of the chest that lasts more than a few minutes, or that goes away and comes back.  It can feel like uncomfortable pressure, squeezing, fullness, or pain.  Discomfort in other areas of the upper body. Symptoms can include pain or discomfort in one or both arms, the back, neck, jaw, or stomach.  Shortness of breath with or without chest discomfort.  Other signs may include breaking out in a cold sweat, nausea, or lightheadedness. Don't wait more than five minutes to call 911 - MINUTES MATTER! Fast action can save your life. Calling 911 is almost always the fastest way to get lifesaving treatment. Emergency Medical Services staff can begin treatment when they arrive -- up to an hour sooner than if someone gets to the hospital by car. The discharge information has been reviewed with the patient and spouse. The patient and spouse verbalized understanding. Discharge medications reviewed with the patient and spouse and appropriate educational materials and side effects teaching were provided.   ___________________________________________________________________________________________________________________________________

## 2018-07-03 NOTE — BRIEF OP NOTE
BRIEF OPERATIVE NOTE    Date of Procedure: 7/3/2018   Preoperative Diagnosis: COSMETIC   Postoperative Diagnosis: COSMETIC     Procedure(s):  CIRCUMFERENTIAL ABDOMINOPLASTY  Surgeon(s) and Role:     * Zo Nation MD - Primary         Surgical Assistant: [facility]    Surgical Staff:  Circ-1: Kenneth Abbasi RN  Circ-Relief: Richard Zamora RN; Zena Stewart RN  Registered Nurse Assistant: Neema Simpson RN  Scrub Tech-Relief: Michael Bennett  Scrub RN-1: Fracisco Gabriel RN  Scrub RN-Relief: Corey Bullock RN  Surg Asst-Relief: Michael Bennett  Event Time In   Incision Start 1131   Incision Close 1620     Anesthesia: General   Estimated Blood Loss: 250  Specimens: * No specimens in log *   Findings: normal   Complications: none  Implants: * No implants in log *

## 2018-07-03 NOTE — ANESTHESIA POSTPROCEDURE EVALUATION
Post-Anesthesia Evaluation and Assessment    Patient: Socrates Vera MRN: [id number]  SSN: [id number]    YOB: 1980  Age: 40 y.o. Sex: female       Cardiovascular Function/Vital Signs  Visit Vitals    /69    Pulse 89    Temp 36.3 °C (97.4 °F)    Resp 14    Ht 5' 8\" (1.727 m)    Wt 65.7 kg (144 lb 13.5 oz)    SpO2 100%    BMI 22.02 kg/m2       Patient is status post general anesthesia for Procedure(s):  CIRCUMFERENTIAL ABDOMINOPLASTY. Nausea/Vomiting: None    Postoperative hydration reviewed and adequate. Pain:  Pain Scale 1: Numeric (0 - 10) (07/03/18 1715)  Pain Intensity 1: 0 (07/03/18 1700)   Managed    Neurological Status:   Neuro (WDL): Within Defined Limits (07/03/18 1644)  Neuro  Neurologic State: Sleeping (07/03/18 1644)  LUE Motor Response: Purposeful (07/03/18 1644)  LLE Motor Response: Purposeful (07/03/18 1644)  RUE Motor Response: Purposeful (07/03/18 1644)  RLE Motor Response: Purposeful (07/03/18 1644)   At baseline    Mental Status and Level of Consciousness: Arousable    Pulmonary Status:   O2 Device: Room air (07/03/18 1659)   Adequate oxygenation and airway patent    Complications related to anesthesia: None    Post-anesthesia assessment completed.  No concerns    Signed By: Franci Cosme MD     July 3, 2018

## 2018-07-03 NOTE — ANESTHESIA PREPROCEDURE EVALUATION
Anesthetic History   No history of anesthetic complications            Review of Systems / Medical History  Patient summary reviewed, nursing notes reviewed and pertinent labs reviewed    Pulmonary  Within defined limits                 Neuro/Psych   Within defined limits           Cardiovascular  Within defined limits                     GI/Hepatic/Renal     GERD           Endo/Other  Within defined limits           Other Findings              Physical Exam    Airway  Mallampati: II  TM Distance: > 6 cm  Neck ROM: normal range of motion   Mouth opening: Normal     Cardiovascular  Regular rate and rhythm,  S1 and S2 normal,  no murmur, click, rub, or gallop             Dental  No notable dental hx       Pulmonary  Breath sounds clear to auscultation               Abdominal  GI exam deferred       Other Findings            Anesthetic Plan    ASA: 2  Anesthesia type: general          Induction: Intravenous  Anesthetic plan and risks discussed with: Patient

## 2019-06-24 ENCOUNTER — HOSPITAL ENCOUNTER (OUTPATIENT)
Dept: MRI IMAGING | Age: 39
Discharge: HOME | End: 2019-06-24
Attending: ORTHOPAEDIC SURGERY
Payer: OTHER GOVERNMENT

## 2019-06-24 DIAGNOSIS — M25.561 RIGHT KNEE PAIN: ICD-10-CM

## 2019-06-24 PROCEDURE — 73721 MRI JNT OF LWR EXTRE W/O DYE: CPT

## 2019-09-04 NOTE — PERIOP NOTES
Call placed to patient to schedule PAT. Patient states that she was not told that she needed to see her PCP for H&P and that it is difficult to get in to PCP for visit. Message left with Naheed Portillo to discuss.     PAT scheduled, patient states that she will call PCP to see if she is able to schedule H&P.

## 2019-09-05 NOTE — PERIOP NOTES
H&P plan:  PCP or Jair    1) If pt is going to PCP office, please ask her to have them fax to PAT. 2) If she was unable to get an appt, Faraz Clarke will do H&P. After PAT appt on 9/6/19:  Please inform Heriberto Aceves @ [facility] office if he will be responsible.

## 2019-09-06 ENCOUNTER — HOSPITAL ENCOUNTER (OUTPATIENT)
Dept: PREADMISSION TESTING | Age: 39
Discharge: HOME | End: 2019-09-06
Payer: OTHER GOVERNMENT

## 2019-09-06 VITALS
OXYGEN SATURATION: 100 % | RESPIRATION RATE: 18 BRPM | TEMPERATURE: 98.9 F | BODY MASS INDEX: 24.89 KG/M2 | HEIGHT: 68 IN | HEART RATE: 86 BPM | SYSTOLIC BLOOD PRESSURE: 115 MMHG | WEIGHT: 164.25 LBS | DIASTOLIC BLOOD PRESSURE: 79 MMHG

## 2019-09-06 LAB
ANION GAP SERPL CALC-SCNC: 6 MMOL/L (ref 5–15)
ATRIAL RATE: 80 BPM
BUN SERPL-MCNC: 10 MG/DL (ref 6–20)
BUN/CREAT SERPL: 14 (ref 12–20)
CALCIUM SERPL-MCNC: 9.3 MG/DL (ref 8.5–10.1)
CALCULATED P AXIS, ECG09: 76 DEGREES
CALCULATED R AXIS, ECG10: 58 DEGREES
CALCULATED T AXIS, ECG11: 51 DEGREES
CHLORIDE SERPL-SCNC: 107 MMOL/L (ref 97–108)
CO2 SERPL-SCNC: 27 MMOL/L (ref 21–32)
CREAT SERPL-MCNC: 0.7 MG/DL (ref 0.55–1.02)
DIAGNOSIS, 93000: NORMAL
GLUCOSE SERPL-MCNC: 78 MG/DL (ref 65–100)
P-R INTERVAL, ECG05: 164 MS
POTASSIUM SERPL-SCNC: 4.4 MMOL/L (ref 3.5–5.1)
Q-T INTERVAL, ECG07: 384 MS
QRS DURATION, ECG06: 82 MS
QTC CALCULATION (BEZET), ECG08: 442 MS
SODIUM SERPL-SCNC: 140 MMOL/L (ref 136–145)
VENTRICULAR RATE, ECG03: 80 BPM

## 2019-09-06 PROCEDURE — 80048 BASIC METABOLIC PNL TOTAL CA: CPT

## 2019-09-06 PROCEDURE — 36415 COLL VENOUS BLD VENIPUNCTURE: CPT

## 2019-09-06 PROCEDURE — 93005 ELECTROCARDIOGRAM TRACING: CPT

## 2019-09-06 RX ORDER — MISOPROSTOL 100 UG/1
TABLET ORAL ONCE
COMMUNITY
End: 2019-09-06

## 2019-09-06 RX ORDER — METOPROLOL TARTRATE 50 MG/1
50 TABLET ORAL
COMMUNITY

## 2019-09-06 NOTE — PERIOP NOTES
[address] [address] [facility]   MAIN OR                                  [phone]   MAIN PRE OP                          [phone]                                                                                AMBULATORY PRE OP          [phone]  PRE-ADMISSION TESTING    [phone]     Surgery Date:  Friday, Sept. 13, 2019        Is surgery arrival time given by surgeon? NO  If NO, [facility] staff will call you between 3 and 7pm the day before your surgery with your arrival time. (If your surgery is on a Monday, we will call you the Friday before.)    Call [phone] after 7pm Monday-Friday if you did not receive your arrival time. INSTRUCTIONS BEFORE YOUR SURGERY   When You  Arrive Arrive at the 2nd [address] [facility] on the day of your surgery  Have your insurance card, photo ID, and any copayment (if needed)   Food   and   Drink NO food or drink after midnight the night before surgery    This means NO water, gum, mints, coffee, juice, etc.  No alcohol (beer, wine, liquor) 24 hours before and after surgery   Medications to   TAKE   Morning of Surgery MEDICATIONS TO TAKE THE MORNING OF SURGERY WITH A SIP OF WATER:    Levothyroxine   metoprolol   prilosec   effexor   Medications  To  STOP      7 days before surgery  Non-Steroidal anti-inflammatory Drugs (NSAID's): for example, Ibuprofen (Advil, Motrin), Naproxen (Aleve)   Aspirin, if taking for pain    Herbal supplements, vitamins, and fish oil   Other:  (Pain medications not listed above, including Tylenol may be taken)   Blood  Thinners  If you take  Aspirin, Plavix, Coumadin, or any blood-thinning or anti-blood clot medicine, talk to the doctor who prescribed the medications for pre-operative instructions.    Bathing Clothing  Jewelry  Valuables       If you shower the morning of surgery, please do not apply anything to your skin (lotions, powders, deodorant, or makeup, especially mascara)   Follow all special bath instructions (for total joint replacement, spine and bowel surgeries)   Do not shave or trim anywhere 24 hours before surgery   Wear your hair loose or down; no pony-tails, buns, or metal hair clips   Wear loose, comfortable, clean clothes   Wear glasses instead of contacts   Leave money, valuables, and jewelry, including body piercings, at home   Going Home - or Spending the Night  SAME-DAY SURGERY: You must have a responsible adult drive you home and stay with you 24 hours after surgery   ADMITS: If your doctor is keeping you in the hospital after surgery, leave personal belongings/luggage in your car until you have a hospital room number. Hospital discharge time is 12 noon  Drivers must be here before 12 noon unless you are told differently   Special Instructions Bring completed medications sheet to hospital.  Free  parking from 7am until 5pm.     Follow all instructions so your surgery wont be cancelled. Please, be on time. If a situation occurs and you are delayed the day of surgery, call [phone]. If your physical condition changes (like a fever, cold, flu, etc.) call your surgeon. The patient was contacted  in person. Home medication reviewed and verified during PAT appointment. The patient verbalizes understanding of all instructions and does not  need reinforcement.

## 2019-09-06 NOTE — PERIOP NOTES
Patient stated that she had a cardiac evaluation for palpitations approximately 6 months ago. Patient states she cannot remember who the doctor was or the group. Patient instructed to find out the information at home and to call [facility] at [phone] or [phone] with the name of the cardiologist.      Patient stated she is having a pre op physical at [facility] on [address] Sept 10. Patient given fax number and asked to have clinic fax copy of H&P to PAT at [phone]. Patient verbalized an understanding of all instructions.

## 2019-09-11 NOTE — PERIOP NOTES
Call placed to [address] to request pre-op H&P, request to be faxed to medical records. Request for pre-op H&P faxed to [phone].

## 2019-09-12 NOTE — PERIOP NOTES
Received the last cardiology notes, stress test, and echo from 12/2018. The testing was all negative. Received the medical clearance/ H&P from [address]. All documents placed on paper chart.   DOS: 9/13/2019

## 2019-09-12 NOTE — PERIOP NOTES
Left a VM for Elise at Dr. Sandoval Risk office informing her that we have received the medial clearance/ H&P for surgery.   DOS: 9/13/2019

## 2019-09-13 ENCOUNTER — ANESTHESIA (OUTPATIENT)
Dept: SURGERY | Age: 39
End: 2019-09-13
Payer: OTHER GOVERNMENT

## 2019-09-13 ENCOUNTER — HOSPITAL ENCOUNTER (OUTPATIENT)
Age: 39
Setting detail: OUTPATIENT SURGERY
Discharge: HOME | End: 2019-09-13
Attending: ORTHOPAEDIC SURGERY | Admitting: ORTHOPAEDIC SURGERY
Payer: OTHER GOVERNMENT

## 2019-09-13 ENCOUNTER — ANESTHESIA EVENT (OUTPATIENT)
Dept: SURGERY | Age: 39
End: 2019-09-13
Payer: OTHER GOVERNMENT

## 2019-09-13 VITALS
WEIGHT: 162.48 LBS | OXYGEN SATURATION: 100 % | RESPIRATION RATE: 17 BRPM | TEMPERATURE: 98.2 F | DIASTOLIC BLOOD PRESSURE: 77 MMHG | HEART RATE: 82 BPM | SYSTOLIC BLOOD PRESSURE: 114 MMHG | HEIGHT: 68 IN | BODY MASS INDEX: 24.62 KG/M2

## 2019-09-13 LAB — HCG UR QL: NEGATIVE

## 2019-09-13 PROCEDURE — 81025 URINE PREGNANCY TEST: CPT

## 2019-09-13 PROCEDURE — 74011250636 HC RX REV CODE- 250/636

## 2019-09-13 PROCEDURE — 97116 GAIT TRAINING THERAPY: CPT

## 2019-09-13 PROCEDURE — 77030020268 HC MISC GENERAL SUPPLY: Performed by: ORTHOPAEDIC SURGERY

## 2019-09-13 PROCEDURE — 77030011640 HC PAD GRND REM COVD -A: Performed by: ORTHOPAEDIC SURGERY

## 2019-09-13 PROCEDURE — 77030020143 HC AIRWY LARYN INTUB CGAS -A: Performed by: ANESTHESIOLOGY

## 2019-09-13 PROCEDURE — 74011250636 HC RX REV CODE- 250/636: Performed by: ANESTHESIOLOGY

## 2019-09-13 PROCEDURE — 74011250636 HC RX REV CODE- 250/636: Performed by: STUDENT IN AN ORGANIZED HEALTH CARE EDUCATION/TRAINING PROGRAM

## 2019-09-13 PROCEDURE — 74011250636 HC RX REV CODE- 250/636: Performed by: ORTHOPAEDIC SURGERY

## 2019-09-13 PROCEDURE — 77030012935 HC DRSG AQUACEL BMS -B: Performed by: ORTHOPAEDIC SURGERY

## 2019-09-13 PROCEDURE — 77030020782 HC GWN BAIR PAWS FLX 3M -B

## 2019-09-13 PROCEDURE — 77030028907 HC WRP KNEE WO BGS SOLM -B

## 2019-09-13 PROCEDURE — 74011000250 HC RX REV CODE- 250: Performed by: ORTHOPAEDIC SURGERY

## 2019-09-13 PROCEDURE — 77030002933 HC SUT MCRYL J&J -A: Performed by: ORTHOPAEDIC SURGERY

## 2019-09-13 PROCEDURE — 77030018836 HC SOL IRR NACL ICUM -A: Performed by: ORTHOPAEDIC SURGERY

## 2019-09-13 PROCEDURE — 77030020269 HC MISC IMPL: Performed by: ORTHOPAEDIC SURGERY

## 2019-09-13 PROCEDURE — 76060000063 HC AMB SURG ANES 1.5 TO 2 HR: Performed by: ORTHOPAEDIC SURGERY

## 2019-09-13 PROCEDURE — 76210000057 HC AMBSU PH II REC 1 TO 1.5 HR: Performed by: ORTHOPAEDIC SURGERY

## 2019-09-13 PROCEDURE — 64447 NJX AA&/STRD FEMORAL NRV IMG: CPT

## 2019-09-13 PROCEDURE — 77030033067 HC SUT PDO STRATFX SPIR J&J -B: Performed by: ORTHOPAEDIC SURGERY

## 2019-09-13 PROCEDURE — 77030000032 HC CUF TRNQT ZIMM -B: Performed by: ORTHOPAEDIC SURGERY

## 2019-09-13 PROCEDURE — 64445 NJX AA&/STRD SCIATIC NRV IMG: CPT

## 2019-09-13 PROCEDURE — 76030000003 HC AMB SURG OR TIME 1.5 TO 2: Performed by: ORTHOPAEDIC SURGERY

## 2019-09-13 PROCEDURE — C1762 CONN TISS, HUMAN(INC FASCIA): HCPCS | Performed by: ORTHOPAEDIC SURGERY

## 2019-09-13 PROCEDURE — 77030040361 HC SLV COMPR DVT MDII -B

## 2019-09-13 PROCEDURE — 77030039266 HC ADH SKN EXOFIN S2SG -A: Performed by: ORTHOPAEDIC SURGERY

## 2019-09-13 PROCEDURE — 77030002966 HC SUT PDS J&J -A: Performed by: ORTHOPAEDIC SURGERY

## 2019-09-13 PROCEDURE — 77030031139 HC SUT VCRL2 J&J -A: Performed by: ORTHOPAEDIC SURGERY

## 2019-09-13 PROCEDURE — 97161 PT EVAL LOW COMPLEX 20 MIN: CPT

## 2019-09-13 PROCEDURE — 76210000034 HC AMBSU PH I REC 0.5 TO 1 HR: Performed by: ORTHOPAEDIC SURGERY

## 2019-09-13 PROCEDURE — 74011250637 HC RX REV CODE- 250/637: Performed by: ANESTHESIOLOGY

## 2019-09-13 PROCEDURE — 77030006835 HC BLD SAW SAG STRY -B: Performed by: ORTHOPAEDIC SURGERY

## 2019-09-13 PROCEDURE — 77030013708 HC HNDPC SUC IRR PULS STRY –B: Performed by: ORTHOPAEDIC SURGERY

## 2019-09-13 PROCEDURE — 77030018673: Performed by: ORTHOPAEDIC SURGERY

## 2019-09-13 PROCEDURE — 77030003601 HC NDL NRV BLK BBMI -A

## 2019-09-13 RX ORDER — OXYCODONE HCL 20 MG/1
20 TABLET, FILM COATED, EXTENDED RELEASE ORAL ONCE
Status: DISCONTINUED | OUTPATIENT
Start: 2019-09-13 | End: 2019-09-13

## 2019-09-13 RX ORDER — PROPOFOL 10 MG/ML
INJECTION, EMULSION INTRAVENOUS AS NEEDED
Status: DISCONTINUED | OUTPATIENT
Start: 2019-09-13 | End: 2019-09-13 | Stop reason: HOSPADM

## 2019-09-13 RX ORDER — ROPIVACAINE HYDROCHLORIDE 5 MG/ML
INJECTION, SOLUTION EPIDURAL; INFILTRATION; PERINEURAL AS NEEDED
Status: DISCONTINUED | OUTPATIENT
Start: 2019-09-13 | End: 2019-09-13 | Stop reason: HOSPADM

## 2019-09-13 RX ORDER — FENTANYL CITRATE 50 UG/ML
INJECTION, SOLUTION INTRAMUSCULAR; INTRAVENOUS AS NEEDED
Status: DISCONTINUED | OUTPATIENT
Start: 2019-09-13 | End: 2019-09-13 | Stop reason: HOSPADM

## 2019-09-13 RX ORDER — ONDANSETRON 2 MG/ML
4 INJECTION INTRAMUSCULAR; INTRAVENOUS AS NEEDED
Status: DISCONTINUED | OUTPATIENT
Start: 2019-09-13 | End: 2019-09-13 | Stop reason: HOSPADM

## 2019-09-13 RX ORDER — FENTANYL CITRATE 50 UG/ML
INJECTION, SOLUTION INTRAMUSCULAR; INTRAVENOUS AS NEEDED
Status: DISCONTINUED | OUTPATIENT
Start: 2019-09-13 | End: 2019-09-13

## 2019-09-13 RX ORDER — OXYCODONE HYDROCHLORIDE 5 MG/1
10 TABLET ORAL
Status: DISCONTINUED | OUTPATIENT
Start: 2019-09-13 | End: 2019-09-13 | Stop reason: HOSPADM

## 2019-09-13 RX ORDER — MIDAZOLAM HYDROCHLORIDE 1 MG/ML
INJECTION, SOLUTION INTRAMUSCULAR; INTRAVENOUS AS NEEDED
Status: DISCONTINUED | OUTPATIENT
Start: 2019-09-13 | End: 2019-09-13 | Stop reason: HOSPADM

## 2019-09-13 RX ORDER — DEXAMETHASONE SODIUM PHOSPHATE 4 MG/ML
INJECTION, SOLUTION INTRA-ARTICULAR; INTRALESIONAL; INTRAMUSCULAR; INTRAVENOUS; SOFT TISSUE AS NEEDED
Status: DISCONTINUED | OUTPATIENT
Start: 2019-09-13 | End: 2019-09-13 | Stop reason: HOSPADM

## 2019-09-13 RX ORDER — KETOROLAC TROMETHAMINE 30 MG/ML
15 INJECTION, SOLUTION INTRAMUSCULAR; INTRAVENOUS
Status: COMPLETED | OUTPATIENT
Start: 2019-09-13 | End: 2019-09-13

## 2019-09-13 RX ORDER — FENTANYL CITRATE 50 UG/ML
25 INJECTION, SOLUTION INTRAMUSCULAR; INTRAVENOUS
Status: DISCONTINUED | OUTPATIENT
Start: 2019-09-13 | End: 2019-09-13 | Stop reason: HOSPADM

## 2019-09-13 RX ORDER — CELECOXIB 100 MG/1
100 CAPSULE ORAL ONCE
Status: DISCONTINUED | OUTPATIENT
Start: 2019-09-13 | End: 2019-09-13

## 2019-09-13 RX ORDER — LIDOCAINE HYDROCHLORIDE 10 MG/ML
0.1 INJECTION, SOLUTION EPIDURAL; INFILTRATION; INTRACAUDAL; PERINEURAL AS NEEDED
Status: DISCONTINUED | OUTPATIENT
Start: 2019-09-13 | End: 2019-09-13 | Stop reason: HOSPADM

## 2019-09-13 RX ORDER — ROPIVACAINE HYDROCHLORIDE 2 MG/ML
INJECTION, SOLUTION EPIDURAL; INFILTRATION; PERINEURAL AS NEEDED
Status: DISCONTINUED | OUTPATIENT
Start: 2019-09-13 | End: 2019-09-13 | Stop reason: HOSPADM

## 2019-09-13 RX ORDER — ONDANSETRON 2 MG/ML
INJECTION INTRAMUSCULAR; INTRAVENOUS AS NEEDED
Status: DISCONTINUED | OUTPATIENT
Start: 2019-09-13 | End: 2019-09-13 | Stop reason: HOSPADM

## 2019-09-13 RX ORDER — SCOLOPAMINE TRANSDERMAL SYSTEM 1 MG/1
1 PATCH, EXTENDED RELEASE TRANSDERMAL
Status: DISCONTINUED | OUTPATIENT
Start: 2019-09-13 | End: 2019-09-13 | Stop reason: HOSPADM

## 2019-09-13 RX ORDER — HYDROMORPHONE HYDROCHLORIDE 1 MG/ML
.5-1 INJECTION, SOLUTION INTRAMUSCULAR; INTRAVENOUS; SUBCUTANEOUS
Status: DISCONTINUED | OUTPATIENT
Start: 2019-09-13 | End: 2019-09-13 | Stop reason: HOSPADM

## 2019-09-13 RX ORDER — SODIUM CHLORIDE, SODIUM LACTATE, POTASSIUM CHLORIDE, CALCIUM CHLORIDE 600; 310; 30; 20 MG/100ML; MG/100ML; MG/100ML; MG/100ML
125 INJECTION, SOLUTION INTRAVENOUS CONTINUOUS
Status: DISCONTINUED | OUTPATIENT
Start: 2019-09-13 | End: 2019-09-13 | Stop reason: HOSPADM

## 2019-09-13 RX ORDER — ACETAMINOPHEN 325 MG/1
975 TABLET ORAL ONCE
Status: DISCONTINUED | OUTPATIENT
Start: 2019-09-13 | End: 2019-09-13

## 2019-09-13 RX ADMIN — FENTANYL CITRATE 100 MCG: 50 INJECTION, SOLUTION INTRAMUSCULAR; INTRAVENOUS at 08:28

## 2019-09-13 RX ADMIN — KETOROLAC TROMETHAMINE 15 MG: 30 INJECTION, SOLUTION INTRAMUSCULAR at 11:11

## 2019-09-13 RX ADMIN — MIDAZOLAM HYDROCHLORIDE 2 MG: 1 INJECTION, SOLUTION INTRAMUSCULAR; INTRAVENOUS at 08:28

## 2019-09-13 RX ADMIN — FENTANYL CITRATE 50 MCG: 50 INJECTION, SOLUTION INTRAMUSCULAR; INTRAVENOUS at 09:00

## 2019-09-13 RX ADMIN — ROPIVACAINE HYDROCHLORIDE 20 ML: 2 INJECTION, SOLUTION EPIDURAL; INFILTRATION; PERINEURAL at 08:35

## 2019-09-13 RX ADMIN — PROPOFOL 150 MG: 10 INJECTION, EMULSION INTRAVENOUS at 09:06

## 2019-09-13 RX ADMIN — CEFAZOLIN 2 G: 1 INJECTION, POWDER, FOR SOLUTION INTRAMUSCULAR; INTRAVENOUS at 09:09

## 2019-09-13 RX ADMIN — FENTANYL CITRATE 50 MCG: 50 INJECTION, SOLUTION INTRAMUSCULAR; INTRAVENOUS at 10:40

## 2019-09-13 RX ADMIN — ONDANSETRON 4 MG: 2 INJECTION INTRAMUSCULAR; INTRAVENOUS at 10:26

## 2019-09-13 RX ADMIN — ROPIVACAINE HYDROCHLORIDE 30 ML: 5 INJECTION, SOLUTION EPIDURAL; INFILTRATION; PERINEURAL at 08:32

## 2019-09-13 RX ADMIN — DEXAMETHASONE SODIUM PHOSPHATE 4 MG: 4 INJECTION, SOLUTION INTRAMUSCULAR; INTRAVENOUS at 09:09

## 2019-09-13 RX ADMIN — MIDAZOLAM HYDROCHLORIDE 2 MG: 1 INJECTION, SOLUTION INTRAMUSCULAR; INTRAVENOUS at 09:00

## 2019-09-13 RX ADMIN — SODIUM CHLORIDE, POTASSIUM CHLORIDE, SODIUM LACTATE AND CALCIUM CHLORIDE: 600; 310; 30; 20 INJECTION, SOLUTION INTRAVENOUS at 09:00

## 2019-09-13 NOTE — BRIEF OP NOTE
BRIEF OPERATIVE NOTE    Date of Procedure: 9/13/2019   Preoperative Diagnosis: Rigth knee osteochondral defect  Postoperative Diagnosis: Rigth knee osteochondral defect   Procedure(s):  RIGHT KNEE OPEN OSTEOCHONDRAL ALLOGRAFT TRANSPLANTATION TO MEDIA FEMORAL CONDYLE (GEN W/BLOCK)  Surgeon(s) and Role:     Yessenia Jain MD - Primary         Surgical Assistant: Alberto Gibson    Surgical Staff:  Circ-1: Margaret Sagastume RN  Scrub Tech-1: [facility]  Scrub Tech-2: Rachana Espinoza  Surg Asst-1: Cristo Gonzales case tracking events are documented in the log. Anesthesia: General   Estimated Blood Loss: none  Specimens: * No specimens in log *   Findings: Rigth knee osteochondral defect   Complications: none  Implants:   Implant Name Type Inv.  Item Serial No.  Lot No. LRB No. Used Action   medial femoral condyle right fresh aseptic right legnth 6.8cm width 2.6cm tibia width 8.1cm   [id number] ALLO SOURCE NA Right 1 Implanted

## 2019-09-13 NOTE — ANESTHESIA POSTPROCEDURE EVALUATION
Procedure(s):  RIGHT KNEE OPEN OSTEOCHONDRAL ALLOGRAFT TRANSPLANTATION TO MEDIA FEMORAL CONDYLE (GEN W/BLOCK). general    Anesthesia Post Evaluation      Multimodal analgesia: multimodal analgesia not used between 6 hours prior to anesthesia start to PACU discharge  Patient location during evaluation: PACU  Patient participation: complete - patient participated  Level of consciousness: awake  Pain management: adequate  Airway patency: patent  Anesthetic complications: no  Cardiovascular status: acceptable, blood pressure returned to baseline and hemodynamically stable  Respiratory status: acceptable  Hydration status: acceptable  Post anesthesia nausea and vomiting:  controlled      Vitals Value Taken Time   /80 9/13/2019 11:45 AM   Temp 36.5 °C (97.7 °F) 9/13/2019 10:55 AM   Pulse 83 9/13/2019 11:45 AM   Resp 18 9/13/2019 11:45 AM   SpO2 100 % 9/13/2019 11:45 AM   Vitals shown include unvalidated device data.

## 2019-09-13 NOTE — OP NOTES
[facility]  OPERATIVE REPORT    Name:  Riley Vidal  MR#:  [id number]  :  1980  ACCOUNT #:  [de-identified]  DATE OF SERVICE:  2019      PREOPERATIVE DIAGNOSIS:  Right knee medial femoral condyle osteochondral defect. POSTOPERATIVE DIAGNOSIS:  Right knee medial femoral condyle osteochondral defect. PROCEDURE PERFORMED:  Right knee open allograft articular cartilage transplantation to medial femoral condyle of right knee. SURGEON:  Candy Page MD    ASSISTANT:  Tammy Seals    ANESTHESIA:  General plus regional block. COMPLICATIONS:  None. ESTIMATED BLOOD LOSS:  None. SPECIMENS REMOVED:  None. DRAINS:  None. IMPLANTS:  Fresh osteochondral allograft. DESCRIPTION:  The patient was brought to the operating room, given general anesthesia, supine position. Soft roll and tourniquet applied to the thigh, prepped and draped in a sterile fashion with ChloraPrep. Extremity exsanguinated, tourniquet inflated. Previous anteromedial incision was utilized. It was extended proximally, carried down through the subcutaneous tissue sharply. A medial parapatellar arthrotomy was performed. Retractors were inserted with the knee in flexion. The Cartiform graft was identified. It was not attached. It was peeling off of the underlying subchondral bone. The graft was removed. The underlying subchondral bone had discoloration. The area was measured for a [employer] with appropriate length and width. Next, the fresh osteochondral allograft was opened on the back table, it was cleaned. It was placed into the cutting guide. The appropriate area of the graft was identified to match the defect. Standard Uni process was utilized to remove the graft from the medial femoral condyle. It was soaked on the back table in the patient's ACP. Next, the recipient site was prepared for the  [address] The joint was thoroughly irrigated.   The graft was then placed into the defect and secured in place. There was excellent security and less than 1 mm of countersinking superiorly. The medial, lateral and inferior aspects were flushed. The joint was thoroughly irrigated with PulsaVac lavage. The subcutaneous and deep tissues were injected with 40 mL of the [place] joint solution for postoperative pain control. Next, the extensor mechanism was closed with #1 Vicryl interrupted sutures and a #2 Stratafix running suture, subcutaneous 2-0 Vicryl and 2-0 Monocryl subcuticular sutures with a sterile dressing. The patient was awakened and returned to recovery in stable condition.   Family was notified of her condition      Perez Burgos MD      [id number]  D:  09/13/2019 10:48  T:  09/13/2019 10:58  JOB #:  [id number]

## 2019-09-13 NOTE — DISCHARGE INSTRUCTIONS
DISCHARGE SUMMARY from your Nurse    The following personal items collected during your admission are returned to you:   Dental Appliance: Dental Appliances: None  Vision:    Hearing Aid:    Jewelry: Jewelry: None  Clothing: Clothing: Footwear, Pants, Shirt, Undergarments  Other Valuables: Other Valuables: None  Valuables sent to safe: Personal Items Sent to Safe: n/a    PATIENT INSTRUCTIONS:    After general anesthesia or intravenous sedation, for 24 hours or while taking prescription Narcotics:  · Limit your activities  · Do not drive and operate hazardous machinery  · Do not make important personal or business decisions  · Do  not drink alcoholic beverages  · If you have not urinated within 8 hours after discharge, please contact your surgeon on call. Report the following to your surgeon:  · Excessive pain, swelling, redness or odor of or around the surgical area  · Temperature over 100.5  · Nausea and vomiting lasting longer than 4 hours or if unable to take medications  · Any signs of decreased circulation or nerve impairment to extremity: change in color, persistent  numbness, tingling, coldness or increase pain  · Any questions    COUGH AND DEEP BREATHE    Breathing deep and coughing are very important exercises to do after surgery. Deep breathing and coughing open the little air tubes and air sacks in your lungs. You take deep breaths every day. You may not even notice - it is just something you do when you sigh or yawn. It is a natural exercise you do to keep these air passages open. After surgery, take deep breaths and cough, on purpose. Coughing and deep breathing help prevent bronchitis and pneumonia after surgery. If you had chest or belly surgery, use a pillow as a \"hug buddy\" and hold it tightly to your chest or belly when you cough. DIRECTIONS:  6. Take 10 to 15 slow deep breaths every hour while awake. 7. Breathe in deeply, and hold it for 2 seconds.   8. Exhale slowly through puckered lips, like blowing up a balloon. 9. After every 4th or 5th deep breath, hug your pillow to your chest or belly and give a hard, deep cough. Yes, it will probably hurt. But doing this exercise is very important part of healing after surgery. Take your pain medicine to help you do this exercise without too much pain. IF YOU HAVE BEEN DIAGNOSED WITH SLEEP APNEA, PLEASE USE YOUR SLEEP APNEA DEVICE OR CPAP MACHINE WHEN YOU INTEND TO NAP AFTER TAKING PAIN MEDICATION. Ankle Pumps    Ankle pumps increase the circulation of oxygenated blood to your lower extremities and decrease your risk for circulation problems such as blood clots. They also stretch the muscles, tendons and ligaments in your foot and ankle, and prevent joint contracture in the ankle and foot, especially after surgeries on the legs. It is important to do ankle pump exercises regularly after surgery because immobility increases your risk for developing a blood clot. Your doctor may also have you take an Aspirin for the next few days as well. If your doctor did not ask you to take an Aspirin, consult with him before starting Aspirin therapy on your own. Slowly point your foot forward, feeling the muscles on the top of your lower leg stretch, and hold this position for 5 seconds. Next, pull your foot back toward you as far as possible, stretching the calf muscles, and hold that position for 5 seconds. Repeat with the other foot. Perform 10 repetitions every hour while awake for both ankles if possible (down and then up with the foot once is one repetition). You should feel gentle stretching of the muscles in your lower leg when doing this exercise. If you feel pain, or your range of motion is limited, don't  Push too hard. Only go the limit your joint and muscles will let you go.   If you have increasing pain, progressively worsening leg warmth or swelling, STOP the exercise and call your doctor. Below is information about the medications your doctor is prescribing after your visit:    Other information in your discharge envelope:  []     PRESCRIPTIONS  []     PHYSICAL THERAPY PRESCRIPTION  []     APPOINTMENT CARDS  []     Regional Anesthesia Pamphlet for block or block with On-Q Catheter from Anesthesia Service  []     Medical device information sheets/pamphlets from their    []     School/work excuse note. []     /parent work excuse note. These are general instructions for a healthy lifestyle:    *  Please give a list of your current medications to your Primary Care Provider. *  Please update this list whenever your medications are discontinued, doses are      changed, or new medications (including over-the-counter products) are added. *  Please carry medication information at all times in case of emergency situations. About Smoking  No smoking / No tobacco products / Avoid exposure to second hand smoke    Surgeon General's Warning:  Quitting smoking now greatly reduces serious risk to your health. Obesity, smoking, and sedentary lifestyle greatly increases your risk for illness and disease. A healthy diet, regular physical exercise & weight monitoring are important for maintaining a healthy lifestyle. Congestive Heart Failure  You may be retaining fluid if you have a history of heart failure or if you experience any of the following symptoms:  Weight gain of 3 pounds or more overnight or 5 pounds in a week, increased swelling in our hands or feet or shortness of breath while lying flat in bed. Please call your doctor as soon as you notice any of these symptoms; do not wait until your next office visit.     Recognize signs and symptoms of STROKE:  F - face looks uneven  A - arms unable to move or move even  S - speech slurred or non-existent  T - time-call 911 as soon as signs and symptoms begin-DO NOT go         Back to bed or wait to see if you get better-TIME IS BRAIN. Warning signs of HEART ATTACK  Call 911 if you have these symptoms    · Chest discomfort. Most heart attacks involve discomfort in the center of the chest that lasts more than a few minutes, or that goes away and comes back. It can feel like uncomfortable pressure, squeezing, fullness, or pain. · Discomfort in other areas of the upper body. Symptoms can include pain or discomfort in one or both        Arms, the back, neck, jaw, or stomach. ·  Shortness of breath with or without chest discomfort. · Other signs may include breaking out in a cold sweat, nausea, or lightheadedness    Don't wait more than five minutes to call 911 - MINUTES MATTER! Fast action can save your life. Calling 911 is almost always the fastest way to get lifesaving treatment. Emergency Medical Services staff can begin treatment when they arrive - up to an hour sooner than if someone gets to the hospital by car.

## 2019-09-13 NOTE — ANESTHESIA PREPROCEDURE EVALUATION
Relevant Problems   No relevant active problems       Anesthetic History   No history of anesthetic complications  PONV          Review of Systems / Medical History  Patient summary reviewed, nursing notes reviewed and pertinent labs reviewed    Pulmonary  Within defined limits        Smoker         Neuro/Psych   Within defined limits           Cardiovascular  Within defined limits  Hypertension              Exercise tolerance: >4 METS  Comments: Palpitations - neg stress test   GI/Hepatic/Renal  Within defined limits   GERD           Endo/Other  Within defined limits    Hypothyroidism       Other Findings   Comments: Hx DVT           Physical Exam    Airway  Mallampati: I    Neck ROM: normal range of motion   Mouth opening: Normal     Cardiovascular  Regular rate and rhythm,  S1 and S2 normal,  no murmur, click, rub, or gallop  Rhythm: regular  Rate: normal         Dental  No notable dental hx       Pulmonary  Breath sounds clear to auscultation               Abdominal  GI exam deferred       Other Findings            Anesthetic Plan    ASA: 2  Anesthesia type: general          Induction: Intravenous  Anesthetic plan and risks discussed with: Patient

## 2019-09-13 NOTE — ANESTHESIA PROCEDURE NOTES
Peripheral Block    Start time: 9/13/2019 8:28 AM  End time: 9/13/2019 8:35 AM  Performed by: Lara Moon MD  Authorized by: Lara Moon MD       Pre-procedure: Indications: at surgeon's request and post-op pain management    Preanesthetic Checklist: patient identified, risks and benefits discussed, site marked, timeout performed, anesthesia consent given and patient being monitored    Timeout Time: 08:28          Block Type:   Block Type:  Popliteal and femoral single shot  Laterality:  Right  Monitoring:  Continuous pulse ox, frequent vital sign checks, heart rate, responsive to questions and oxygen  Injection Technique:  Single shot  Procedures: ultrasound guided and nerve stimulator    Patient Position: supine  Prep: chlorhexidine    Location:  Upper thigh  Needle Type:  Stimuplex  Needle Gauge:  21 G  Needle Localization:  Nerve stimulator and ultrasound guidance    Assessment:  Number of attempts:  1  Injection Assessment:  Incremental injection every 5 mL, local visualized surrounding nerve on ultrasound, negative aspiration for blood, no paresthesia and no intravascular symptoms  Patient tolerance:  Patient tolerated the procedure well with no immediate complications  Popliteal block done under ultrasound guidance and nerve stimulation with incremental injection of Ropivacaine 0.2% 20 cc using a 21 G Stimuplex needle.

## 2019-09-13 NOTE — PROGRESS NOTES
PHYSICAL THERAPY EVALUATION & DISCHARGE  (AMBULATORY SURGERY, EMERGENCY ROOM & RECOVERY ROOM PATIENTS)  Patient: Tara Smith (43 y.o. female)  Date: 2019  Primary Diagnosis: RIGHT KNEE PAIN  Procedure(s) (LRB):  RIGHT KNEE OPEN OSTEOCHONDRAL ALLOGRAFT TRANSPLANTATION TO MEDIA FEMORAL CONDYLE (GEN W/BLOCK) (Right) Day of Surgery   Ordered Weight Bearing Status:  right non-weight  Ordered Equipment: crutches    ASSESSMENT :   Based on the objective data described below, patient presents with Contact guard assistance level overall for transfers. Gait training on level surfaces as well as to negotiate stairs completed at contact guard assistance level using crutches and patient's  verbalizes ability to assist prn. Discharge recommendations: Crutches issued     PLAN :  Skilled intervention and education completed. Discharging further skilled acute physical therapy at this time. SUBJECTIVE:   Patient stated No one before has ever shown me the proper way to use crutches so I don't fall .     OBJECTIVE DATA SUMMARY:   HISTORY:    Past Medical History:   Diagnosis Date    2019    palpitations    GERD (gastroesophageal reflux disease)     History of esophagogastroduodenoscopy (EGD) x2    Hypertension     Hypothyroidism     Nausea & vomiting     Psychiatric disorder     anxiety    Thromboembolus ([address] 2014    right leg DVT    Thyroid disease     hypothryorid     Past Surgical History:   Procedure Laterality Date    HX  SECTION  2004    HX CHOLECYSTECTOMY      HX GASTRIC BYPASS  2016    HX ORTHOPAEDIC  2014    2 surgeries Right Knee    HX ORTHOPAEDIC Right 2019    knee arthroscopy    HX OTHER SURGICAL  2018    body lift     Prior Level of Function/Home Situation: Independent  Personal factors and/or comorbidities impacting plan of care : independent         EXAMINATION/PRESENTATION/DECISION MAKING:   Critical Behavior:  Neurologic State: Alert  Orientation Level: Oriented to person, Oriented to place, Oriented to situation  Cognition: Follows commands            Strength and Range Of Motion limitations:  WNLs  Sensation:   WNLs    Transfers:  Overall level of assistance required following instruction: contact guard assist given verbal and tactile using axillary crutches. Ambulation/gait training:  Weight bearing status during ambulation:     NWB RLE with crutches 40' with CG A, right foot drop probable residual from nerve block patient denies sensation or pain                 Stair Management:  4 steps ascend and descend with one rail and one crutch CG-min A and verbal prompts. Patient and her  verbalizes understanding of correct sequence to maintain NWB RLE    Pain- Denied pain during PT    Activity Tolerance: WNL  Please refer to the flowsheet for vital signs taken during this treatment. After treatment patient left:   Up in wheelchair     COMMUNICATION/EDUCATION:   Role of physical therapy explained to the patient. The patients plan of care was discussed with: Registered Nurse.      Topics addressed: Comments:   [x]                                    Device use and technique    [x]                                    Transfer technique    [x]                                    Gait training    [x]                                    Stair training      Thank you for this referral.    Syd De Anda, PT, DPT   Time Calculation: 21 mins

## 2019-12-27 ENCOUNTER — HOSPITAL ENCOUNTER (OUTPATIENT)
Dept: GENERAL RADIOLOGY | Age: 39
Discharge: HOME | End: 2019-12-27
Attending: ORTHOPAEDIC SURGERY
Payer: OTHER GOVERNMENT

## 2019-12-27 DIAGNOSIS — S73.192D TEAR OF LEFT ACETABULAR LABRUM, SUBSEQUENT ENCOUNTER: ICD-10-CM

## 2019-12-27 DIAGNOSIS — M25.452 EFFUSION, LEFT HIP: ICD-10-CM

## 2019-12-27 PROCEDURE — 74011636320 HC RX REV CODE- 636/320: Performed by: RADIOLOGY

## 2019-12-27 PROCEDURE — 74011250636 HC RX REV CODE- 250/636: Performed by: RADIOLOGY

## 2019-12-27 PROCEDURE — 74011000250 HC RX REV CODE- 250: Performed by: RADIOLOGY

## 2019-12-27 PROCEDURE — 20610 DRAIN/INJ JOINT/BURSA W/O US: CPT

## 2019-12-27 RX ORDER — BUPIVACAINE HYDROCHLORIDE 5 MG/ML
5 INJECTION, SOLUTION EPIDURAL; INTRACAUDAL
Status: COMPLETED | OUTPATIENT
Start: 2019-12-27 | End: 2019-12-27

## 2019-12-27 RX ORDER — TRIAMCINOLONE ACETONIDE 40 MG/ML
40 INJECTION, SUSPENSION INTRA-ARTICULAR; INTRAMUSCULAR
Status: COMPLETED | OUTPATIENT
Start: 2019-12-27 | End: 2019-12-27

## 2019-12-27 RX ORDER — LIDOCAINE HYDROCHLORIDE 10 MG/ML
10 INJECTION, SOLUTION EPIDURAL; INFILTRATION; INTRACAUDAL; PERINEURAL
Status: COMPLETED | OUTPATIENT
Start: 2019-12-27 | End: 2019-12-27

## 2019-12-27 RX ADMIN — IOHEXOL 10 ML: 300 INJECTION, SOLUTION INTRAVENOUS at 16:20

## 2019-12-27 RX ADMIN — TRIAMCINOLONE ACETONIDE 40 MG: 40 INJECTION, SUSPENSION INTRA-ARTICULAR; INTRAMUSCULAR at 16:19

## 2019-12-27 RX ADMIN — LIDOCAINE HYDROCHLORIDE 5 ML: 10 INJECTION, SOLUTION EPIDURAL; INFILTRATION; INTRACAUDAL; PERINEURAL at 16:20

## 2019-12-27 RX ADMIN — BUPIVACAINE HYDROCHLORIDE 25 MG: 5 INJECTION, SOLUTION EPIDURAL; INTRACAUDAL; PERINEURAL at 16:19

## 2020-06-08 RX ORDER — SODIUM CHLORIDE 0.9 % (FLUSH) 0.9 %
5-40 SYRINGE (ML) INJECTION EVERY 8 HOURS
Status: CANCELLED | OUTPATIENT
Start: 2020-06-08

## 2020-06-08 RX ORDER — CEFAZOLIN SODIUM/WATER 2 G/20 ML
2 SYRINGE (ML) INTRAVENOUS ONCE
Status: CANCELLED | OUTPATIENT
Start: 2020-06-15 | End: 2020-06-15

## 2020-06-08 RX ORDER — SODIUM CHLORIDE 0.9 % (FLUSH) 0.9 %
5-40 SYRINGE (ML) INJECTION AS NEEDED
Status: CANCELLED | OUTPATIENT
Start: 2020-06-08

## 2020-06-08 NOTE — H&P
Dayan Beltran  : 1980   / Language: English / Race: White  Female      History of Present Illness   The patient is a 46year old female who presents with a complaint of Hip Pain. The hip pain has been occurring in a persistent pattern. The course has been worsening. The hip pain is described as a severe sharp stabbing. The hip pain is described as being located in the hip. The pain is aggravated by general physical activity, walking, prolonged standing, climbing stairs, lying on affected side, lifting, bending and squatting. There are no relieving factors. Note for \"Hip Pain\": Patient continues to complain of catching and locking in her left hip.  She had an injection.  It did alleviate her pain symptoms for 2 weeks.  The pain symptoms have returned.  The mechanical symptoms never stopped after the injection.  She is back today to discuss a hip arthroscopy.  She and I have discussed this in the past.  She at this point would like to proceed due to the catching and locking in her left hip. Problem List/Past Medical REVIEW OF SYSTEMS: Systems were reviewed by the provider.    Snapping hip, left (719.65  M24.852)    Tear of left acetabular labrum, subsequent encounter (V58.89  S73.192D)    Effusion, left hip (719.05  M25.452)      Allergies  No Known Allergies   [2019]:  No Known Drug Allergies   [2019]: Allergies Reconciled      Family History Alcohol Abuse   Father. Arthritis   Father. Depression   Father, Mother. Heart Disease   Father, Mother. Heart disease in male family member before age 54    Hypercholesterolemia   Father. Hypertension   Father, Mother. Respiratory Condition   Father. Thyroid problems   Mother. Social History  Alcohol use   Never drinks more than 5 drinks per occasion. Caffeine use   3-4 drinks per day, Coffee. Current work status   Unemployed, not looking for work. Exercise   Inactive. Marital status   .   No drug use    Seat Belt Use   Always uses seat belts. Sun Exposure   Frequently. Tobacco / smoke exposure   Family members smoke outdoors only. Tobacco use   Former smoker, Smokes . 75 pack of cigarettes per day. Medication History   Levothyroxine Sodium  (100MCG Tablet, Oral) Active. Metoprolol Succinate ER  (50MG Tablet ER 24HR, Oral) Active. Methocarbamol  (750MG Tablet, Oral) Active. Effexor  (75MG Tablet, Oral) Active. Pantoprazole Sodium  (40MG Tablet DR, Oral) Active. Medications Reconciled     Past Surgical History   Arthroscopy of Knee   right   Delivery   2 times  Foot Surgery   right  Weight Loss Surgery   Bypass    Other Problems  Anxiety Disorder    Arthritis    Blood Clot    Gastroesophageal Reflux Disease    High blood pressure    Thyroid Disease    Unspecified Diagnosis          Review of Systems  General Present- Appetite Loss and Seasonal Allergies. Not Present- Chills, Fatigue, Fever, HIV Exposure, Night Sweats, Persistent Infections, Weight Gain and Weight Loss. Skin Not Present- Itching, Nail Changes, Poor Wound Healing, Rash, Skin Color Changes, Suspicious Lesions and Yellowish Skin Color. HEENT Not Present- Decreased Hearing, Earache, Hoarseness, Loose Teeth, Nose Bleed, Ringing in the Ears and Sore Throat. Respiratory Not Present- Bloody sputum, Chronic Cough, Difficulty Breathing, Snoring, Wakes up from Sleep Wheezing or Short of Breath and Wheezing. Cardiovascular Not Present- Bluish Discoloration Of Lips Or Nails, Chest Pain, Difficulty Breathing Lying Down, Difficulty Breathing On Exertion, Leg Cramps With Exertion, Palpitations and Swelling of Extremities. Gastrointestinal Not Present- Abdominal Pain, Black, Tarry Stool, Change in Bowel Habits, Cirrhosis, Constipation, Diarrhea, Difficulty Swallowing, Nausea and Vomiting. Female Genitourinary Present- Pelvic Pain. Not Present- Blood in Urine, Frequency, Painful Urination, Trouble Starting Urinary Stream and Urgency.   Musculoskeletal Present- Back Pain. Not Present- Joint Pain, Joint Stiffness and Joint Swelling. Neurological Present- Numbness and Tingling. Not Present- Fainting, Headaches, Memory Loss, Seizures, Tremor, Unsteadiness and Weakness. Psychiatric Present- Anxiety. Not Present- Bipolar and Depression. Endocrine Not Present- Cold Intolerance, Excessive Hunger, Excessive Thirst, Excessive Urination and Heat Intolerance. Hematology Not Present- Abnormal Bruising , Enlarged Lymph Nodes, Excessive bleeding and Skin Discoloration. Vitals   Weight: 165 lb   Height: 68 in   Weight was reported by patient. Height was reported by patient. Body Surface Area: 1.88 m²   Body Mass Index: 25.09 kg/m²        Physical Exam   Musculoskeletal  Global Assessment  Examination of related systems reveals - well-developed, well-nourished, in no acute distress, alert and oriented x 3, no rashes or ulcers of bilateral upper and lower extremities, head or trunk, no generalized swelling or edema of extremities, no digital clubbing or cyanosis, neurovascularly intact globally with normal deep tendon reflexes and normal coordination. Gait and Station - normal posture. Left Lower Extremity - Note: Patient's hip was examined. Impingement test positive. Logroll test positive. Hip range of motion full extension to 110 degrees of flexion. When I flex and internally rotate and then extend there is a palpable clunk within her hip. Straight leg raise and femoral nerve stretch test are negative. Extremity is sensate and perfused with palpable dorsalis pedis pulse. Hip flexors, quads, ankle plantar flexors, ankle dorsiflexors have 5 out of 5 strength. Assessment & Plan     Tear of left acetabular labrum, subsequent encounter (V58.89  H07.915D)  Impression: Patient has left hip torn labrum. We have tried to manage her conservatively. Continues to complain of mechanical symptoms. She is had an injection as well as conservative treatment options including NSAIDs. Today she would like to proceed with surgery. All questions were answered. Plan for labral repair. Current Plans  Pt Education - How to [address] using Patient Portal and 3rd Party Apps: discussed with patient and provided information. Pt Education - Educational materials were provided.: discussed with patient and provided information. The risks of the surgery were discussed including infection, deep vein thrombosis, pulmonary embolus, cardiorespiratory complications, anesthetic risks, possible scar formation, cardiac compromise, stroke, death, leg length inequality, subluxation and possible implant failure. Understands the risks and wishes to proceed with surgical intervention.

## 2020-06-11 ENCOUNTER — HOSPITAL ENCOUNTER (OUTPATIENT)
Dept: PREADMISSION TESTING | Age: 40
Discharge: HOME | End: 2020-06-11
Payer: OTHER GOVERNMENT

## 2020-06-11 DIAGNOSIS — Z20.822 ENCOUNTER FOR LABORATORY TESTING FOR COVID-19 VIRUS: ICD-10-CM

## 2020-06-11 PROCEDURE — 87635 SARS-COV-2 COVID-19 AMP PRB: CPT

## 2020-06-12 LAB — SARS-COV-2, COV2NT: NOT DETECTED

## 2020-06-12 RX ORDER — GABAPENTIN 300 MG/1
300 CAPSULE ORAL DAILY
COMMUNITY
End: 2020-12-09 | Stop reason: ALTCHOICE

## 2020-06-12 RX ORDER — OMEPRAZOLE 20 MG/1
20 CAPSULE, DELAYED RELEASE ORAL
COMMUNITY

## 2020-06-12 RX ORDER — GABAPENTIN 300 MG/1
600 CAPSULE ORAL 2 TIMES DAILY
COMMUNITY

## 2020-06-12 NOTE — PERIOP NOTES
Preop phone call completed. Preop instructions and preop medications reveiwed with patient. Pt instructed to Purchase 2  4 oz bottles of CHG soap and instructed in use. normal

## 2020-06-15 ENCOUNTER — HOSPITAL ENCOUNTER (OUTPATIENT)
Age: 40
Setting detail: OUTPATIENT SURGERY
Discharge: HOME | End: 2020-06-15
Attending: ORTHOPAEDIC SURGERY | Admitting: ORTHOPAEDIC SURGERY
Payer: OTHER GOVERNMENT

## 2020-06-15 ENCOUNTER — APPOINTMENT (OUTPATIENT)
Dept: GENERAL RADIOLOGY | Age: 40
End: 2020-06-15
Attending: ORTHOPAEDIC SURGERY
Payer: OTHER GOVERNMENT

## 2020-06-15 ENCOUNTER — ANESTHESIA (OUTPATIENT)
Dept: SURGERY | Age: 40
End: 2020-06-15
Payer: OTHER GOVERNMENT

## 2020-06-15 ENCOUNTER — ANESTHESIA EVENT (OUTPATIENT)
Dept: SURGERY | Age: 40
End: 2020-06-15
Payer: OTHER GOVERNMENT

## 2020-06-15 VITALS
WEIGHT: 178 LBS | SYSTOLIC BLOOD PRESSURE: 121 MMHG | BODY MASS INDEX: 26.98 KG/M2 | DIASTOLIC BLOOD PRESSURE: 72 MMHG | RESPIRATION RATE: 13 BRPM | HEART RATE: 92 BPM | TEMPERATURE: 97.7 F | OXYGEN SATURATION: 99 % | HEIGHT: 68 IN

## 2020-06-15 DIAGNOSIS — S73.192D ACETABULAR LABRUM TEAR, LEFT, SUBSEQUENT ENCOUNTER: Primary | ICD-10-CM

## 2020-06-15 LAB — HCG UR QL: NEGATIVE

## 2020-06-15 PROCEDURE — 81025 URINE PREGNANCY TEST: CPT

## 2020-06-15 PROCEDURE — 77030018673: Performed by: ORTHOPAEDIC SURGERY

## 2020-06-15 PROCEDURE — 76210000020 HC REC RM PH II FIRST 0.5 HR: Performed by: ORTHOPAEDIC SURGERY

## 2020-06-15 PROCEDURE — 77030018835 HC SOL IRR LR ICUM -A: Performed by: ORTHOPAEDIC SURGERY

## 2020-06-15 PROCEDURE — 74011000250 HC RX REV CODE- 250: Performed by: NURSE ANESTHETIST, CERTIFIED REGISTERED

## 2020-06-15 PROCEDURE — 99218 HC RM OBSERVATION: CPT

## 2020-06-15 PROCEDURE — 77030028574 HC ACC KT DISP ARTH -C: Performed by: ORTHOPAEDIC SURGERY

## 2020-06-15 PROCEDURE — 76010000131 HC OR TIME 2 TO 2.5 HR: Performed by: ORTHOPAEDIC SURGERY

## 2020-06-15 PROCEDURE — 76060000035 HC ANESTHESIA 2 TO 2.5 HR: Performed by: ORTHOPAEDIC SURGERY

## 2020-06-15 PROCEDURE — 77030008684 HC TU ET CUF COVD -B: Performed by: ANESTHESIOLOGY

## 2020-06-15 PROCEDURE — 74011250636 HC RX REV CODE- 250/636: Performed by: NURSE ANESTHETIST, CERTIFIED REGISTERED

## 2020-06-15 PROCEDURE — 76210000000 HC OR PH I REC 2 TO 2.5 HR: Performed by: ORTHOPAEDIC SURGERY

## 2020-06-15 PROCEDURE — 74011250636 HC RX REV CODE- 250/636: Performed by: ORTHOPAEDIC SURGERY

## 2020-06-15 PROCEDURE — 74011000250 HC RX REV CODE- 250: Performed by: ORTHOPAEDIC SURGERY

## 2020-06-15 PROCEDURE — 77030038012 HC WND COBLATN S&N -F: Performed by: ORTHOPAEDIC SURGERY

## 2020-06-15 PROCEDURE — 74011250636 HC RX REV CODE- 250/636: Performed by: ANESTHESIOLOGY

## 2020-06-15 PROCEDURE — 77030008462 HC STPLR SKN PROX J&J -A: Performed by: ORTHOPAEDIC SURGERY

## 2020-06-15 PROCEDURE — 77030003666 HC NDL SPINAL BD -A: Performed by: ORTHOPAEDIC SURGERY

## 2020-06-15 PROCEDURE — C1713 ANCHOR/SCREW BN/BN,TIS/BN: HCPCS | Performed by: ORTHOPAEDIC SURGERY

## 2020-06-15 PROCEDURE — 77030002916 HC SUT ETHLN J&J -A: Performed by: ORTHOPAEDIC SURGERY

## 2020-06-15 PROCEDURE — 77030016555 HC BLD SHV INCIS4 S&N -B: Performed by: ORTHOPAEDIC SURGERY

## 2020-06-15 PROCEDURE — 74011250637 HC RX REV CODE- 250/637: Performed by: ANESTHESIOLOGY

## 2020-06-15 PROCEDURE — 77030008496 HC TBNG ARTHSC IRR S&N -B: Performed by: ORTHOPAEDIC SURGERY

## 2020-06-15 PROCEDURE — 77030029352 HC BLD FLL RAD SAMURAI STRY -C: Performed by: ORTHOPAEDIC SURGERY

## 2020-06-15 PROCEDURE — 77030026438 HC STYL ET INTUB CARD -A: Performed by: ANESTHESIOLOGY

## 2020-06-15 PROCEDURE — 77030029353 HC SUT PASS ENDOSC NANOPS STRY -C: Performed by: ORTHOPAEDIC SURGERY

## 2020-06-15 PROCEDURE — 77030016677 HC BUR SHV ABRD S&N -B: Performed by: ORTHOPAEDIC SURGERY

## 2020-06-15 PROCEDURE — 77030028224 HC PDNG CST BSNM -A: Performed by: ORTHOPAEDIC SURGERY

## 2020-06-15 RX ORDER — ROPIVACAINE HYDROCHLORIDE 5 MG/ML
150 INJECTION, SOLUTION EPIDURAL; INFILTRATION; PERINEURAL AS NEEDED
Status: DISCONTINUED | OUTPATIENT
Start: 2020-06-15 | End: 2020-06-15 | Stop reason: HOSPADM

## 2020-06-15 RX ORDER — CEFAZOLIN SODIUM/WATER 2 G/20 ML
2 SYRINGE (ML) INTRAVENOUS ONCE
Status: COMPLETED | OUTPATIENT
Start: 2020-06-15 | End: 2020-06-15

## 2020-06-15 RX ORDER — MIDAZOLAM HYDROCHLORIDE 1 MG/ML
0.5 INJECTION, SOLUTION INTRAMUSCULAR; INTRAVENOUS
Status: DISCONTINUED | OUTPATIENT
Start: 2020-06-15 | End: 2020-06-15 | Stop reason: HOSPADM

## 2020-06-15 RX ORDER — MIDAZOLAM HYDROCHLORIDE 1 MG/ML
1 INJECTION, SOLUTION INTRAMUSCULAR; INTRAVENOUS AS NEEDED
Status: DISCONTINUED | OUTPATIENT
Start: 2020-06-15 | End: 2020-06-15 | Stop reason: HOSPADM

## 2020-06-15 RX ORDER — ROCURONIUM BROMIDE 10 MG/ML
INJECTION, SOLUTION INTRAVENOUS AS NEEDED
Status: DISCONTINUED | OUTPATIENT
Start: 2020-06-15 | End: 2020-06-15 | Stop reason: HOSPADM

## 2020-06-15 RX ORDER — SODIUM CHLORIDE, SODIUM LACTATE, POTASSIUM CHLORIDE, CALCIUM CHLORIDE 600; 310; 30; 20 MG/100ML; MG/100ML; MG/100ML; MG/100ML
1000 INJECTION, SOLUTION INTRAVENOUS CONTINUOUS
Status: DISCONTINUED | OUTPATIENT
Start: 2020-06-15 | End: 2020-06-15 | Stop reason: HOSPADM

## 2020-06-15 RX ORDER — ACETAMINOPHEN 325 MG/1
650 TABLET ORAL ONCE
Status: COMPLETED | OUTPATIENT
Start: 2020-06-15 | End: 2020-06-15

## 2020-06-15 RX ORDER — SODIUM CHLORIDE 0.9 % (FLUSH) 0.9 %
5-40 SYRINGE (ML) INJECTION AS NEEDED
Status: DISCONTINUED | OUTPATIENT
Start: 2020-06-15 | End: 2020-06-15 | Stop reason: HOSPADM

## 2020-06-15 RX ORDER — SODIUM CHLORIDE 9 MG/ML
25 INJECTION, SOLUTION INTRAVENOUS CONTINUOUS
Status: DISCONTINUED | OUTPATIENT
Start: 2020-06-15 | End: 2020-06-15 | Stop reason: HOSPADM

## 2020-06-15 RX ORDER — OXYCODONE HYDROCHLORIDE 5 MG/1
5 TABLET ORAL AS NEEDED
Status: DISCONTINUED | OUTPATIENT
Start: 2020-06-15 | End: 2020-06-15 | Stop reason: HOSPADM

## 2020-06-15 RX ORDER — MIDAZOLAM HYDROCHLORIDE 1 MG/ML
INJECTION, SOLUTION INTRAMUSCULAR; INTRAVENOUS AS NEEDED
Status: DISCONTINUED | OUTPATIENT
Start: 2020-06-15 | End: 2020-06-15 | Stop reason: HOSPADM

## 2020-06-15 RX ORDER — EPHEDRINE SULFATE/0.9% NACL/PF 50 MG/5 ML
5 SYRINGE (ML) INTRAVENOUS AS NEEDED
Status: DISCONTINUED | OUTPATIENT
Start: 2020-06-15 | End: 2020-06-15 | Stop reason: HOSPADM

## 2020-06-15 RX ORDER — BUPIVACAINE HYDROCHLORIDE AND EPINEPHRINE 5; 5 MG/ML; UG/ML
INJECTION, SOLUTION EPIDURAL; INTRACAUDAL; PERINEURAL AS NEEDED
Status: DISCONTINUED | OUTPATIENT
Start: 2020-06-15 | End: 2020-06-15 | Stop reason: HOSPADM

## 2020-06-15 RX ORDER — SCOLOPAMINE TRANSDERMAL SYSTEM 1 MG/1
1 PATCH, EXTENDED RELEASE TRANSDERMAL
Status: DISCONTINUED | OUTPATIENT
Start: 2020-06-15 | End: 2020-06-15 | Stop reason: HOSPADM

## 2020-06-15 RX ORDER — SODIUM CHLORIDE 0.9 % (FLUSH) 0.9 %
5-40 SYRINGE (ML) INJECTION EVERY 8 HOURS
Status: DISCONTINUED | OUTPATIENT
Start: 2020-06-15 | End: 2020-06-15 | Stop reason: HOSPADM

## 2020-06-15 RX ORDER — KETAMINE HYDROCHLORIDE 10 MG/ML
INJECTION, SOLUTION INTRAMUSCULAR; INTRAVENOUS AS NEEDED
Status: DISCONTINUED | OUTPATIENT
Start: 2020-06-15 | End: 2020-06-15 | Stop reason: HOSPADM

## 2020-06-15 RX ORDER — DEXAMETHASONE SODIUM PHOSPHATE 4 MG/ML
INJECTION, SOLUTION INTRA-ARTICULAR; INTRALESIONAL; INTRAMUSCULAR; INTRAVENOUS; SOFT TISSUE AS NEEDED
Status: DISCONTINUED | OUTPATIENT
Start: 2020-06-15 | End: 2020-06-15 | Stop reason: HOSPADM

## 2020-06-15 RX ORDER — FENTANYL CITRATE 50 UG/ML
25 INJECTION, SOLUTION INTRAMUSCULAR; INTRAVENOUS
Status: COMPLETED | OUTPATIENT
Start: 2020-06-15 | End: 2020-06-15

## 2020-06-15 RX ORDER — KETOROLAC TROMETHAMINE 30 MG/ML
INJECTION, SOLUTION INTRAMUSCULAR; INTRAVENOUS AS NEEDED
Status: DISCONTINUED | OUTPATIENT
Start: 2020-06-15 | End: 2020-06-15 | Stop reason: HOSPADM

## 2020-06-15 RX ORDER — FENTANYL CITRATE 50 UG/ML
INJECTION, SOLUTION INTRAMUSCULAR; INTRAVENOUS AS NEEDED
Status: DISCONTINUED | OUTPATIENT
Start: 2020-06-15 | End: 2020-06-15 | Stop reason: HOSPADM

## 2020-06-15 RX ORDER — HYDROMORPHONE HYDROCHLORIDE 1 MG/ML
0.2 INJECTION, SOLUTION INTRAMUSCULAR; INTRAVENOUS; SUBCUTANEOUS
Status: COMPLETED | OUTPATIENT
Start: 2020-06-15 | End: 2020-06-15

## 2020-06-15 RX ORDER — FENTANYL CITRATE 50 UG/ML
50 INJECTION, SOLUTION INTRAMUSCULAR; INTRAVENOUS AS NEEDED
Status: DISCONTINUED | OUTPATIENT
Start: 2020-06-15 | End: 2020-06-15 | Stop reason: HOSPADM

## 2020-06-15 RX ORDER — SODIUM CHLORIDE, SODIUM LACTATE, POTASSIUM CHLORIDE, CALCIUM CHLORIDE 600; 310; 30; 20 MG/100ML; MG/100ML; MG/100ML; MG/100ML
100 INJECTION, SOLUTION INTRAVENOUS CONTINUOUS
Status: DISCONTINUED | OUTPATIENT
Start: 2020-06-15 | End: 2020-06-15 | Stop reason: HOSPADM

## 2020-06-15 RX ORDER — PROPOFOL 10 MG/ML
INJECTION, EMULSION INTRAVENOUS AS NEEDED
Status: DISCONTINUED | OUTPATIENT
Start: 2020-06-15 | End: 2020-06-15 | Stop reason: HOSPADM

## 2020-06-15 RX ORDER — LIDOCAINE HYDROCHLORIDE 10 MG/ML
0.1 INJECTION, SOLUTION EPIDURAL; INFILTRATION; INTRACAUDAL; PERINEURAL AS NEEDED
Status: DISCONTINUED | OUTPATIENT
Start: 2020-06-15 | End: 2020-06-15 | Stop reason: HOSPADM

## 2020-06-15 RX ORDER — ONDANSETRON 2 MG/ML
4 INJECTION INTRAMUSCULAR; INTRAVENOUS AS NEEDED
Status: DISCONTINUED | OUTPATIENT
Start: 2020-06-15 | End: 2020-06-15 | Stop reason: HOSPADM

## 2020-06-15 RX ORDER — ONDANSETRON 2 MG/ML
INJECTION INTRAMUSCULAR; INTRAVENOUS AS NEEDED
Status: DISCONTINUED | OUTPATIENT
Start: 2020-06-15 | End: 2020-06-15 | Stop reason: HOSPADM

## 2020-06-15 RX ORDER — MORPHINE SULFATE 10 MG/ML
2 INJECTION, SOLUTION INTRAMUSCULAR; INTRAVENOUS
Status: DISCONTINUED | OUTPATIENT
Start: 2020-06-15 | End: 2020-06-15 | Stop reason: HOSPADM

## 2020-06-15 RX ORDER — NEOSTIGMINE METHYLSULFATE 1 MG/ML
INJECTION, SOLUTION INTRAVENOUS AS NEEDED
Status: DISCONTINUED | OUTPATIENT
Start: 2020-06-15 | End: 2020-06-15 | Stop reason: HOSPADM

## 2020-06-15 RX ORDER — OXYCODONE HYDROCHLORIDE 5 MG/1
5-10 TABLET ORAL
Qty: 40 TAB | Refills: 0 | Status: SHIPPED | OUTPATIENT
Start: 2020-06-15 | End: 2020-06-18

## 2020-06-15 RX ORDER — SUCCINYLCHOLINE CHLORIDE 20 MG/ML
INJECTION INTRAMUSCULAR; INTRAVENOUS AS NEEDED
Status: DISCONTINUED | OUTPATIENT
Start: 2020-06-15 | End: 2020-06-15 | Stop reason: HOSPADM

## 2020-06-15 RX ORDER — LIDOCAINE HYDROCHLORIDE 20 MG/ML
INJECTION, SOLUTION EPIDURAL; INFILTRATION; INTRACAUDAL; PERINEURAL AS NEEDED
Status: DISCONTINUED | OUTPATIENT
Start: 2020-06-15 | End: 2020-06-15 | Stop reason: HOSPADM

## 2020-06-15 RX ADMIN — MEPERIDINE HYDROCHLORIDE 25 MG: 50 INJECTION INTRAMUSCULAR; INTRAVENOUS; SUBCUTANEOUS at 16:35

## 2020-06-15 RX ADMIN — MIDAZOLAM HYDROCHLORIDE 2 MG: 1 INJECTION, SOLUTION INTRAMUSCULAR; INTRAVENOUS at 14:19

## 2020-06-15 RX ADMIN — PROPOFOL 150 MG: 10 INJECTION, EMULSION INTRAVENOUS at 14:30

## 2020-06-15 RX ADMIN — DEXAMETHASONE SODIUM PHOSPHATE 4 MG: 4 INJECTION, SOLUTION INTRAMUSCULAR; INTRAVENOUS at 14:56

## 2020-06-15 RX ADMIN — LIDOCAINE HYDROCHLORIDE 80 MG: 20 INJECTION, SOLUTION EPIDURAL; INFILTRATION; INTRACAUDAL; PERINEURAL at 14:30

## 2020-06-15 RX ADMIN — FENTANYL CITRATE 25 MCG: 50 INJECTION INTRAMUSCULAR; INTRAVENOUS at 17:11

## 2020-06-15 RX ADMIN — FENTANYL CITRATE 100 MCG: 50 INJECTION, SOLUTION INTRAMUSCULAR; INTRAVENOUS at 14:31

## 2020-06-15 RX ADMIN — KETOROLAC TROMETHAMINE 30 MG: 30 INJECTION, SOLUTION INTRAMUSCULAR; INTRAVENOUS at 16:21

## 2020-06-15 RX ADMIN — FENTANYL CITRATE 100 MCG: 50 INJECTION, SOLUTION INTRAMUSCULAR; INTRAVENOUS at 14:30

## 2020-06-15 RX ADMIN — OXYCODONE 5 MG: 5 TABLET ORAL at 18:12

## 2020-06-15 RX ADMIN — ONDANSETRON 4 MG: 2 INJECTION INTRAMUSCULAR; INTRAVENOUS at 17:03

## 2020-06-15 RX ADMIN — FENTANYL CITRATE 25 MCG: 50 INJECTION INTRAMUSCULAR; INTRAVENOUS at 17:05

## 2020-06-15 RX ADMIN — HYDROMORPHONE HYDROCHLORIDE 0.2 MG: 1 INJECTION, SOLUTION INTRAMUSCULAR; INTRAVENOUS; SUBCUTANEOUS at 17:48

## 2020-06-15 RX ADMIN — Medication 2 G: at 14:42

## 2020-06-15 RX ADMIN — FENTANYL CITRATE 25 MCG: 50 INJECTION INTRAMUSCULAR; INTRAVENOUS at 17:22

## 2020-06-15 RX ADMIN — ROCURONIUM BROMIDE 5 MG: 10 SOLUTION INTRAVENOUS at 14:30

## 2020-06-15 RX ADMIN — MIDAZOLAM HYDROCHLORIDE 1 MG: 1 INJECTION, SOLUTION INTRAMUSCULAR; INTRAVENOUS at 14:26

## 2020-06-15 RX ADMIN — HYDROMORPHONE HYDROCHLORIDE 0.2 MG: 1 INJECTION, SOLUTION INTRAMUSCULAR; INTRAVENOUS; SUBCUTANEOUS at 18:03

## 2020-06-15 RX ADMIN — ONDANSETRON HYDROCHLORIDE 4 MG: 2 INJECTION, SOLUTION INTRAMUSCULAR; INTRAVENOUS at 14:56

## 2020-06-15 RX ADMIN — KETAMINE HYDROCHLORIDE 20 MG: 10 INJECTION, SOLUTION INTRAMUSCULAR; INTRAVENOUS at 14:56

## 2020-06-15 RX ADMIN — SODIUM CHLORIDE, POTASSIUM CHLORIDE, SODIUM LACTATE AND CALCIUM CHLORIDE: 600; 310; 30; 20 INJECTION, SOLUTION INTRAVENOUS at 11:30

## 2020-06-15 RX ADMIN — FENTANYL CITRATE 25 MCG: 50 INJECTION INTRAMUSCULAR; INTRAVENOUS at 17:17

## 2020-06-15 RX ADMIN — HYDROMORPHONE HYDROCHLORIDE 0.2 MG: 1 INJECTION, SOLUTION INTRAMUSCULAR; INTRAVENOUS; SUBCUTANEOUS at 17:56

## 2020-06-15 RX ADMIN — SUCCINYLCHOLINE CHLORIDE 160 MG: 20 INJECTION, SOLUTION INTRAMUSCULAR; INTRAVENOUS at 14:30

## 2020-06-15 RX ADMIN — SODIUM CHLORIDE, SODIUM LACTATE, POTASSIUM CHLORIDE, AND CALCIUM CHLORIDE 1000 ML: 600; 310; 30; 20 INJECTION, SOLUTION INTRAVENOUS at 17:16

## 2020-06-15 RX ADMIN — MIDAZOLAM HYDROCHLORIDE 2 MG: 1 INJECTION, SOLUTION INTRAMUSCULAR; INTRAVENOUS at 14:22

## 2020-06-15 RX ADMIN — FENTANYL CITRATE 50 MCG: 50 INJECTION, SOLUTION INTRAMUSCULAR; INTRAVENOUS at 14:47

## 2020-06-15 RX ADMIN — ACETAMINOPHEN 650 MG: 325 TABLET ORAL at 11:51

## 2020-06-15 RX ADMIN — HYDROMORPHONE HYDROCHLORIDE 0.2 MG: 1 INJECTION, SOLUTION INTRAMUSCULAR; INTRAVENOUS; SUBCUTANEOUS at 17:29

## 2020-06-15 RX ADMIN — ROCURONIUM BROMIDE 45 MG: 10 SOLUTION INTRAVENOUS at 14:36

## 2020-06-15 RX ADMIN — Medication 3 MG: at 16:19

## 2020-06-15 RX ADMIN — HYDROMORPHONE HYDROCHLORIDE 0.2 MG: 1 INJECTION, SOLUTION INTRAMUSCULAR; INTRAVENOUS; SUBCUTANEOUS at 17:39

## 2020-06-15 NOTE — BRIEF OP NOTE
Brief Postoperative Note    Patient: Cher Abraham  YOB: 1980  MRN: [id number]    Date of Procedure: 6/15/2020     Pre-Op Diagnosis: TEAR OF LEFT ACETABULAR LABRUM    Post-Op Diagnosis: Same as preoperative diagnosis. and loose body left hip      Procedure(s):  LEFT HIP ARTHROSCOPY WITH LABRAL REPAIR    Surgeon(s):  Sergey Leon MD    Surgical Assistant: None    Anesthesia: General     Estimated Blood Loss (mL): Minimal    Complications: None    Specimens: * No specimens in log *     Implants:   Implant Name Type Inv. Item Serial No.  Lot No. LRB No. Used Action   SUTURE ANCHOR, HIP BIOCOMPOSITE PUSH LOCK   NA  [id number] Left 2 Implanted       Drains: * No LDAs found *    Findings: Loose bodies multiple. Grade 4 in acetabulum and femoral head. Torn Labrum.     Electronically Signed by Lexi Arevalo MD on 6/15/2020 at 4:22 PM

## 2020-06-15 NOTE — H&P
Date of Surgery Update:  Lucita Ferguson was seen and examined. History and physical has been reviewed. The patient has been examined.  There have been no significant clinical changes since the completion of the originally dated History and Physical.    Signed By: Justine Brennan MD     Renetta 15, 2020 7:48 AM

## 2020-06-15 NOTE — DISCHARGE INSTRUCTIONS
** Use ice on your hip. The more ice, the better, especially in the first several days after your surgery. ** Do not bend your hip past 90 degrees. ** Do not place any more than 50% weight on your right leg for 6 weeks after surgery. ** Use your crutches  ** Follow up with Dr. Lazarus April in 10 days. Please call [phone] for an appointment. ** Take 81mg of Aspirin daily to prevent clots. Call if you have GI upset. [facility]  [phone]    Hip Arthroscopy: What to Expect at Home  Your Recovery  Arthroscopy is a way to find problems and do surgery inside a joint without making a large cut (incision). Your doctor put a lighted tube with a tiny camera called an arthroscope, or scope and surgical tools through small incisions in your hip. You will feel tired for several days. Your hip will be swollen, and you may notice that your skin is a different color near the cuts (incisions). The swelling is normal and will start to go away in a few days. Keeping your leg higher than your heart will help with swelling and pain. Occasionally, some people notice a color change in the toes of their operative leg. You will probably need about 6 weeks to recover. If your doctor repaired damaged tissue, however, recovery will take longer. For example, if your labrum is repaired, you will be on crutches for 6 weeks. You may have to limit your activity until your hip strength and movement return to normal. You may also be in a physical rehabilitation (rehab) program.  You may be able to return to a desk job or your normal routine in a few days. But if you do physical labor, it may be as long as 2 months before you can return to work. This care sheet gives you a general idea about how long it will take for you to recover. But each person recovers at a different pace. Follow the steps below to get better as quickly as possible. How can you care for yourself at home?   Activity  · Rest when you feel tired. Getting enough sleep will help you recover. Use pillows to raise your ankle and leg above the level of your heart. · Try to walk each day, after your doctor has said you can. Start by walking a little more than you did the day before. Bit by bit, increase the amount you walk. Walking boosts blood flow and helps prevent pneumonia and constipation. If you are sent home with crutches, please use them to walk. · You may be sent home with crutches and be instructed to avoid placing any weight on your operative leg. Use your crutches until you are instructed that you don't need them any more (usually 6 weeks). · If you have a desk job, you may be able to return to work a few days after the surgery. If you lift things or stand or walk a lot at work, it may be as long as 2 months before you can return. · You can take a shower 48 to 72 hours after surgery and clean the incisions with regular soap and water. Do not take a bath or soak your hip until your doctor says it is okay. · Ask your doctor when you can drive again. Diet  · You can eat your normal diet. If your stomach is upset, try bland, low-fat foods like plain rice, broiled chicken, toast, and yogurt. · You may notice that your bowel movements are not regular right after your surgery. This is common. Try to avoid constipation and straining with bowel movements. You may want to take a fiber supplement every day. If you have not had a bowel movement after a couple of days, ask your doctor about taking a mild laxative. Just remember to drink plenty of fluids, especially if you are taking fiber or a laxative. Medicines  · Take pain medicines exactly as directed. ¨ If the doctor gave you a prescription medicine for pain, take it as you need it but not more often than prescribed. ¨ If you are not taking a prescription pain medicine, ask your doctor if you can take an over-the-counter medicine.   ¨ Do not take two or more pain medicines at the same time unless the doctor told you to. Many pain medicines have acetaminophen, which is Tylenol. Too much acetaminophen (Tylenol) can be harmful. · If you think your pain medicine is making you sick to your stomach:  ¨ Take your medicine after meals (unless your doctor has told you not to). ¨ Ask your doctor for a different pain medicine. · If your doctor prescribed antibiotics, take them as directed. Do not stop taking them just because you feel better. You need to finish the full course of antibiotics. Incision care  · If you have a dressing over your cuts (incisions) you may remove it 48 to 72 hours after the surgery. If there is no drainage, you can leave your incisions uncovered. If there is drainage, please cover the incisions with gauze and tape and change the dressing daily. · If your incisions are open to the air, keep the area clean and dry. · You will most likely have staples or external suture. If these get caught on your clothes, you can cover them lightly with a bandaid or light dressing. They will be removed at your first office visit. If you have strips of tape on the incisions, the strips will usually fall off on their own. .  Exercise  · Move your toes and ankle as much as your bandages will allow. .  · Do rehab exercises as directed by your doctor or physical therapist.  This may not be until 6 weeks after your surgery. Physical therapy is a very important part of your healing. · Stop any activity that causes sharp pain. Talk to your doctor or physical therapist about what sports or other exercise you can do. Ice and elevation  · To reduce swelling and pain, put ice or a cold pack on your hip for 20 to 30 minutes at a time. Do this every 1 to 2 hours. Put a thin cloth between the ice and your skin. The more ice the better for the first several days. Follow-up care is a key part of your treatment and safety.  Be sure to make and go to all appointments, and call your doctor if you are having problems. It's also a good idea to know your test results and keep a list of the medicines you take. When should you call for help? Call 911 anytime you think you may need emergency care. For example, call if:  · You pass out (lose consciousness). · You have severe trouble breathing. · You have sudden chest pain and shortness of breath, or you cough up blood. Call your doctor now or seek immediate medical care if:  · Your foot or toes are numb or tingling. · Your foot is cool or pale, or it changes color. · You are sick to your stomach or cannot keep fluids down. · You have pain that does not get better after you take pain medicine. · You have loose stitches, or your incision comes open. · Bright red blood has soaked through the bandage over your incision. · You have signs of infection, such as:  ¨ Increased pain, swelling, warmth, or redness. ¨ Red streaks leading from the incisions. ¨ Pus draining from the incisions. ¨ Swollen lymph nodes in your neck, armpits, or groin. ¨ A fever. Watch closely for any changes in your health, and be sure to contact your doctor if:  · You do not have a bowel movement after taking a laxative.  ______________________________________________________________________    Anesthesia Discharge Instructions    After general anesthesia or intervenous sedation, for 24 hours or while taking prescription Narcotics:  · Limit your activities  · Do not drive or operate hazardous machinery  · If you have not urinated within 8 hours after discharge, please contact your surgeon on call.   · Do not make important personal or business decisions  · Do not drink alcoholic beverages    Report the following to your surgeon:  · Excessive pain, swelling, redness or odor of or around the surgical area  · Temperature over 100.5 degrees  · Nausea and vomiting lasting longer than 4 hours or if unable to take medication  · Any signs of decreased circulation or nerve impairment to extremity: Change in color, persistent numbness, tingling, coldness or increased pain. · Any questions    Learning About Coronavirus (COVID-19)  Coronavirus [phone]): Overview  What is coronavirus (COVID-19)? The coronavirus disease (COVID-19) is caused by a virus. It is an illness that was first found in [place], [place], in December 2019. It has since spread worldwide. The virus can cause fever, cough, and trouble breathing. In severe cases, it can cause pneumonia and make it hard to breathe without help. It can cause death. Coronaviruses are a large group of viruses. They cause the common cold. They also cause more serious illnesses like Middle East respiratory syndrome (MERS) and severe acute respiratory syndrome (SARS). COVID-19 is caused by a novel coronavirus. That means it's a new type that has not been seen in people before. This virus spreads person-to-person through droplets from coughing and sneezing. It can also spread when you are close to someone who is infected. And it can spread when you touch something that has the virus on it, such as a doorknob or a tabletop. What can you do to protect yourself from coronavirus (COVID-19)? The best way to protect yourself from getting sick is to: Avoid areas where there is an outbreak. Avoid contact with people who may be infected. Wash your hands often with soap or alcohol-based hand sanitizers. Avoid crowds and try to stay at least 6 feet away from other people. Wash your hands often, especially after you cough or sneeze. Use soap and water, and scrub for at least 20 seconds. If soap and water aren't available, use an alcohol-based hand . Avoid touching your mouth, nose, and eyes. What can you do to avoid spreading the virus to others? To help avoid spreading the virus to others:  Cover your mouth with a tissue when you cough or sneeze. Then throw the tissue in the trash. Use a disinfectant to clean things that you touch often.   Stay home if you are sick or have been exposed to the virus. Don't go to school, work, or public areas. And don't use public transportation. If you are sick:  Leave your home only if you need to get medical care. But call the doctor's office first so they know you're coming. And wear a face mask, if you have one. If you have a face mask, wear it whenever you're around other people. It can help stop the spread of the virus when you cough or sneeze. Clean and disinfect your home every day. Use household  and disinfectant wipes or sprays. Take special care to clean things that you grab with your hands. These include doorknobs, remote controls, phones, and handles on your refrigerator and microwave. And don't forget countertops, tabletops, bathrooms, and computer keyboards. When to call for help  Call 911 anytime you think you may need emergency care. For example, call if:  You have severe trouble breathing. (You can't talk at all.)  You have constant chest pain or pressure. You are severely dizzy or lightheaded. You are confused or can't think clearly. Your face and lips have a blue color. You pass out (lose consciousness) or are very hard to wake up. Call your doctor now if you develop symptoms such as:  Shortness of breath. Fever. Cough. If you need to get care, call ahead to the doctor's office for instructions before you go. Make sure you wear a face mask, if you have one, to prevent exposing other people to the virus. Where can you get the latest information? The following health organizations are tracking and studying this virus. Their websites contain the most up-to-date information. Christy Melendez also learn what to do if you think you may have been exposed to the virus. U.S. Centers for Disease Control and Prevention (CDC): The CDC provides updated news about the disease and travel advice. The website also tells you how to prevent the spread of infection.  www.cdc.gov  World Health Organization [facility]): WHO offers information about the virus outbreaks. WHO also has travel advice. www.who.int  Current as of: April 1, 2020               Content Version: 12.4  © 2006-2020 [employer]. Care instructions adapted under license by your healthcare professional. If you have questions about a medical condition or this instruction, always ask your healthcare professional. [address] any warranty or liability for your use of this information.

## 2020-06-15 NOTE — PERIOP NOTES
Patient: Cher Abraham MRN: [id number]  SSN: [id number]   YOB: 1980  Age: 46 y.o. Sex: female     Patient is status post Procedure(s):  LEFT HIP ARTHROSCOPY WITH LABRAL REPAIR.     Surgeon(s) and Role:     Matt Ma MD - Primary    Local/Dose/Irrigation:  SEE EMAR                          Airway - Endotracheal Tube 06/15/20 Oral (Active)                   Dressing/Packing:  Wound Hip Left-Dressing Type: ABD pad;Petroleum gauze (06/15/20 6237)    Splint/Cast:  ]    Other:

## 2020-06-15 NOTE — ANESTHESIA POSTPROCEDURE EVALUATION
Post-Anesthesia Evaluation and Assessment    Patient: Vita Duke MRN: [id number]  SSN: [id number]    YOB: 1980  Age: 46 y.o. Sex: female      I have evaluated the patient and they are stable and ready for discharge from the PACU. Cardiovascular Function/Vital Signs  Visit Vitals  /68   Pulse 85   Temp (!) 35.7 °C (96.3 °F)   Resp 16   Ht 5' 8\" (1.727 m)   Wt 80.7 kg (178 lb)   SpO2 97%   BMI 27.06 kg/m²       Patient is status post General anesthesia for Procedure(s):  LEFT HIP ARTHROSCOPY WITH LABRAL REPAIR. Nausea/Vomiting: None    Postoperative hydration reviewed and adequate. Pain:  Pain Scale 1: Numeric (0 - 10) (06/15/20 1705)  Pain Intensity 1: 8 (06/15/20 1705)   Managed    Neurological Status:   Neuro (WDL): Exceptions to WDL (06/15/20 1640)  Neuro  Neurologic State: Anesthetized; Eyes do not open to any stimulus (06/15/20 1640)   At baseline    Mental Status, Level of Consciousness: Alert and  oriented to person, place, and time    Pulmonary Status:   O2 Device: Room air (06/15/20 1640)   Adequate oxygenation and airway patent    Complications related to anesthesia: None    Post-anesthesia assessment completed. No concerns    Signed By: Fabrizio Mccormick MD     Renetta 15, 2020              Procedure(s):  LEFT HIP ARTHROSCOPY WITH LABRAL REPAIR. general    <BSHSIANPOST>    INITIAL Post-op Vital signs:   Vitals Value Taken Time   /70 6/15/2020  5:00 PM   Temp 35.7 °C (96.3 °F) 6/15/2020  4:40 PM   Pulse 83 6/15/2020  5:13 PM   Resp 17 6/15/2020  5:13 PM   SpO2 99 % 6/15/2020  5:13 PM   Vitals shown include unvalidated device data.

## 2020-06-15 NOTE — ANESTHESIA PREPROCEDURE EVALUATION
Relevant Problems   No relevant active problems       Anesthetic History     PONV          Review of Systems / Medical History  Patient summary reviewed, nursing notes reviewed and pertinent labs reviewed    Pulmonary  Within defined limits                 Neuro/Psych         Psychiatric history     Cardiovascular    Hypertension                   GI/Hepatic/Renal     GERD           Endo/Other      Hypothyroidism  Arthritis     Other Findings              Physical Exam    Airway  Mallampati: II  TM Distance: > 6 cm  Neck ROM: normal range of motion   Mouth opening: Normal     Cardiovascular  Regular rate and rhythm,  S1 and S2 normal,  no murmur, click, rub, or gallop             Dental  No notable dental hx       Pulmonary  Breath sounds clear to auscultation               Abdominal  GI exam deferred       Other Findings            Anesthetic Plan    ASA: 2  Anesthesia type: general          Induction: Intravenous  Anesthetic plan and risks discussed with: Patient

## 2020-06-16 NOTE — OP NOTES
[facility]  OPERATIVE REPORT    Name:  Neo Castro  MR#:  [id number]  :  1980  ACCOUNT #:  [de-identified]  DATE OF SERVICE:  06/15/2020    PREOPERATIVE DIAGNOSIS:  Left hip labral tear. POSTOPERATIVE DIAGNOSES:  1. Left hip labral tear. 2.  Multiple loose bodies, left hip. 3.  Degenerative joint disease, left hip. PROCEDURES PERFORMED:  Arthroscopy, left hip, loose body removal, repair of acetabular labrum. SURGEON:  Billy Bal MD    ASSISTANT:  None. ANESTHESIA:  General.    COMPLICATIONS:  None. SPECIMENS REMOVED:  None. IMPLANTS:  Arthrex PushLock suture anchors x2. ESTIMATED BLOOD LOSS:  Minimal.    PREOPERATIVE ANTIBIOTIC:  Ancef. Counts were correct at the end of the procedure. INDICATIONS:  A 30-year-old woman with persistent complaints of catching and locking in her left hip. PROCEDURE:  Anesthetic was initiated. Preoperative dose of antibiotic was given. The left side was confirmed as the operative site. She was turned into the lateral decubitus position and the left side was prepped and draped in usual sterile fashion. I distracted the hip, and under fluoroscopic guidance when the hip started to open, a spinal needle was placed into the hip joint to release the vacuum. At this point, the traction was reduced to the least amount necessary. Anterior and posterior peritrochanteric portals were established under fluoroscopic guidance. The posterior portal was established all fluoroscopically, anterior under direct visualization of the hip joint. The portals were expanded with [place] blade and the hip was examined from above the level of the anterior-inferior iliac spine to directly superior. There was significant softening of the acetabular cartilage with a torn labrum. Down in the pulvinar multiple loose bodies, probably 4-5 were evacuated.   I examined the top of the femoral head and the top of the femoral head has some grade 4 changes. The posterior acetabulum had some grade 4 changes. Anterior acetabulum was in relatively good condition, but definite impingement was noted. I dissected above the acetabulum down to the bone with the electrocautery and the overhanging pincer lesion was identified and removed with a bur. I removed the pincer lesion back to the level of the cartilage softening, and then two suture anchors were placed, and I repaired the labrum back to the bleeding bed of bone. The hip joint was then copiously irrigated and drained, traction was slowly reduced. Cannulas were removed. Portals were closed with staples and injected with local anesthetic. There were no complications. No specimen was sent. Procedure was arthroscopy, left hip, repair of labrum, acetabuloplasty, removal of loose bodies. The patient tolerated the procedure well and was taken to the recovery room stable.         Lizet Du MD [email or web address]  D:  06/15/2020 16:27  T:  06/15/2020 20:16  JOB #:  [id number]

## 2020-12-09 ENCOUNTER — OFFICE VISIT (OUTPATIENT)
Dept: UROLOGY | Age: 40
End: 2020-12-09
Payer: OTHER GOVERNMENT

## 2020-12-09 VITALS — BODY MASS INDEX: 26.52 KG/M2 | WEIGHT: 175 LBS | TEMPERATURE: 97.4 F | HEIGHT: 68 IN

## 2020-12-09 DIAGNOSIS — M54.50 CHRONIC RIGHT-SIDED LOW BACK PAIN WITHOUT SCIATICA: ICD-10-CM

## 2020-12-09 DIAGNOSIS — N29 NEPHROCALCINOSIS: ICD-10-CM

## 2020-12-09 DIAGNOSIS — N28.1 RENAL CYST: ICD-10-CM

## 2020-12-09 DIAGNOSIS — G89.29 CHRONIC RIGHT-SIDED LOW BACK PAIN WITHOUT SCIATICA: ICD-10-CM

## 2020-12-09 DIAGNOSIS — E83.59 NEPHROCALCINOSIS: ICD-10-CM

## 2020-12-09 DIAGNOSIS — R31.0 GROSS HEMATURIA: Primary | ICD-10-CM

## 2020-12-09 DIAGNOSIS — Z86.39 HISTORY OF MORBID OBESITY: ICD-10-CM

## 2020-12-09 LAB
BILIRUB UR QL STRIP: NORMAL
GLUCOSE UR-MCNC: NEGATIVE MG/DL
KETONES P FAST UR STRIP-MCNC: NORMAL MG/DL
PH UR STRIP: 5.5 [PH] (ref 4.6–8)
PROT UR QL STRIP: NEGATIVE
SP GR UR STRIP: 1.02 (ref 1–1.03)
UA UROBILINOGEN AMB POC: NORMAL (ref 0.2–1)
URINALYSIS CLARITY POC: CLEAR
URINALYSIS COLOR POC: YELLOW
URINE BLOOD POC: NORMAL
URINE LEUKOCYTES POC: NEGATIVE
URINE NITRITES POC: NEGATIVE

## 2020-12-09 PROCEDURE — 81003 URINALYSIS AUTO W/O SCOPE: CPT | Performed by: UROLOGY

## 2020-12-09 PROCEDURE — 99244 OFF/OP CNSLTJ NEW/EST MOD 40: CPT | Performed by: UROLOGY

## 2020-12-09 RX ORDER — LORATADINE 10 MG/1
10 TABLET ORAL
COMMUNITY

## 2020-12-09 RX ORDER — MONTELUKAST SODIUM 10 MG/1
10 TABLET ORAL DAILY
COMMUNITY

## 2020-12-09 NOTE — ASSESSMENT & PLAN NOTE
Her back pain appears musculoskeletal.  I do not suspect renal colic or obstructive uropathy. She will use heating pads and follow-up with her usual doctors about this.

## 2020-12-09 NOTE — ASSESSMENT & PLAN NOTE
Renal calcification not likely a kidney stone. Calcification. Stable.   Will image renal cystic lesions with contrast.

## 2020-12-09 NOTE — PROGRESS NOTES
HISTORY OF PRESENT ILLNESS  Lizzie Soto is a 45 y.o. female. Chief Complaint   Patient presents with    New Patient    Renal Cyst     She is seen for microhematuria and a cyst on her kidney. Her past medical history includes anxiety, hypothyroidism, hypertension,  x2, cholecystectomy, knee surgery x4 on the right, gastric bypass and left hip surgery. She had a CT scan on 2020 which was reviewed. She has a tiny calcification of the right upper pole kidney and some renal hypodensities, possibly cysts. She has had several years of pain on the right flank pain. She has gets injections every 9-12 months which help. She had one in November and it has not been helping as it has in the past.  She has constant pain 10 out of 10. She states she had cysts on her kidney > 20 years. She is fearful because her grandmother had kidney cysts and her sister had kidney cancer. She has gross hematuria. It has been a chronic problem. She has a history of kidney stones without treatment or prior urologic evaluation. It started before bariatric surgery. She has seen blood many years intermittently, >5yrs. The last time was last week. She is a former smoker for 4 years. She quit 2018. She denies a history of drug use reported. Her allergy medication was reviewed. She lost 148# over 4 years after bariatric surgery. Subsequent abdominoplasty. Chronic Conditions Addressed Today     1. History of morbid obesity     Current Assessment & Plan      Prior bariatric surgery and weight loss. History of abdominal cosmetic surgery. 2. Nephrocalcinosis     Overview      2020 CT abd/pel noncontrast with a RUP 2-3mm calcification. Stable from 2018. Left kidney with 1.1 and 1.5cm intrarenal hypodensities. Current Assessment & Plan      Renal calcification not likely a kidney stone. Calcification. Stable.   Will image renal cystic lesions with contrast.          3. Renal cyst     Current Assessment & Plan      1.1 and 1.5 cm left renal hypodensities, intrarenal.  I will order a contrast and noncontrast studies to better image. 4. Gross hematuria - Primary     Current Assessment & Plan      She does not have a definite explanation. No obvious renal stones, just calcification. I recommend evaluation of her upper and lower tracts. We discussed a CT abd/pel with and without contrast and cysto/ RPGs. 5. Chronic right-sided low back pain without sciatica     Current Assessment & Plan      Her back pain appears musculoskeletal.  I do not suspect renal colic or obstructive uropathy. She will use heating pads and follow-up with her usual doctors about this. Review of Systems   All other systems reviewed and are negative.       Past Medical History:   Diagnosis Date    Arrhythmia     palpitations    Arthritis     Chronic pain     BACK PAIN LOWER    GERD (gastroesophageal reflux disease)     History of esophagogastroduodenoscopy (EGD) x2    Hypertension     Hypothyroidism     Motion sickness     Nausea & vomiting     Psychiatric disorder     anxiety    Thromboembolus ([address]     right leg DVT FOLLOWING KNEE SURGERY    Thyroid disease     hypothryorid      Past Surgical History:   Procedure Laterality Date    HX  SECTION      HX  SECTION  2012    HX CHOLECYSTECTOMY  2010    HX GASTRIC BYPASS  2016    HX KNEE ARTHROSCOPY Right     HX KNEE ARTHROSCOPY Right     HX KNEE ARTHROSCOPY Right     HX KNEE ARTHROSCOPY Right     HX ORTHOPAEDIC      hip surgery     HX OTHER SURGICAL  2018    body lift     Family History   Problem Relation Age of Onset    Hypertension Mother     Thyroid Disease Mother     Hypertension Father     Heart Disease Father     Lung Disease Father         COPD    Anesth Problems Father         STOPPED BREATHING    No Known Problems Sister     Heart Disease Maternal Grandmother Physical Exam  Vitals signs reviewed. Constitutional:       General: She is not in acute distress. Appearance: Normal appearance. She is obese. She is not ill-appearing, toxic-appearing or diaphoretic. HENT:      Head: Normocephalic and atraumatic. Mouth/Throat:      Mouth: Mucous membranes are moist.      Pharynx: Oropharynx is clear. Eyes:      Extraocular Movements: Extraocular movements intact. Conjunctiva/sclera: Conjunctivae normal.      Pupils: Pupils are equal, round, and reactive to light. Neck:      Musculoskeletal: Normal range of motion. Cardiovascular:      Rate and Rhythm: Normal rate and regular rhythm. Pulmonary:      Effort: Pulmonary effort is normal. No respiratory distress. Breath sounds: Normal breath sounds. Abdominal:      General: Bowel sounds are normal.      Palpations: Abdomen is soft. There is no mass. Tenderness: There is no abdominal tenderness. There is no left CVA tenderness. Hernia: No hernia is present. Musculoskeletal: Normal range of motion. General: Tenderness (right posterior hip) present. No swelling or deformity. Lymphadenopathy:      Cervical: No cervical adenopathy. Upper Body:      Right upper body: No supraclavicular adenopathy. Left upper body: No supraclavicular adenopathy. Skin:     General: Skin is warm and dry. Neurological:      General: No focal deficit present. Mental Status: She is alert and oriented to person, place, and time. Psychiatric:         Mood and Affect: Mood normal.         Behavior: Behavior normal.                 CT images from 9/8/2020 were reviewed. Details noted in the problem list.    ASSESSMENT and PLAN  Diagnoses and all orders for this visit:    1. Gross hematuria  Assessment & Plan:  She does not have a definite explanation. No obvious renal stones, just calcification. I recommend evaluation of her upper and lower tracts.   We discussed a CT abd/pel with and without contrast and cysto/ RPGs. Orders:  -     CT ABD PELV W WO CONT; Future    2. Nephrocalcinosis  Assessment & Plan:  Renal calcification not likely a kidney stone. Calcification. Stable. Will image renal cystic lesions with contrast.       3. Renal cyst  Assessment & Plan:  1.1 and 1.5 cm left renal hypodensities, intrarenal.  I will order a contrast and noncontrast studies to better image. Orders:  -     CT ABD PELV W WO CONT; Future    4. Chronic right-sided low back pain without sciatica  Assessment & Plan:  Her back pain appears musculoskeletal.  I do not suspect renal colic or obstructive uropathy. She will use heating pads and follow-up with her usual doctors about this. 5. History of morbid obesity  Assessment & Plan:  Prior bariatric surgery and weight loss. History of abdominal cosmetic surgery.       Other orders  -     AMB POC URINALYSIS DIP STICK AUTO W/O MICRO  -     URINALYSIS W/MICROSCOPIC             Azam Izaguirre MD

## 2020-12-09 NOTE — ASSESSMENT & PLAN NOTE
1.1 and 1.5 cm left renal hypodensities, intrarenal.  I will order a contrast and noncontrast studies to better image.

## 2020-12-09 NOTE — ASSESSMENT & PLAN NOTE
She does not have a definite explanation. No obvious renal stones, just calcification. I recommend evaluation of her upper and lower tracts. We discussed a CT abd/pel with and without contrast and cysto/ RPGs.

## 2020-12-10 LAB
APPEARANCE UR: CLEAR
BACTERIA #/AREA URNS HPF: ABNORMAL /[HPF]
BILIRUB UR QL STRIP: NEGATIVE
CASTS URNS QL MICRO: ABNORMAL /LPF
COLOR UR: YELLOW
EPI CELLS #/AREA URNS HPF: ABNORMAL /HPF (ref 0–10)
GLUCOSE UR QL: NEGATIVE
HGB UR QL STRIP: NEGATIVE
KETONES UR QL STRIP: ABNORMAL
LEUKOCYTE ESTERASE UR QL STRIP: NEGATIVE
MICRO URNS: ABNORMAL
MICRO URNS: ABNORMAL
MUCOUS THREADS URNS QL MICRO: PRESENT
NITRITE UR QL STRIP: NEGATIVE
PH UR STRIP: 5.5 [PH] (ref 5–7.5)
PROT UR QL STRIP: ABNORMAL
RBC #/AREA URNS HPF: ABNORMAL /HPF (ref 0–2)
SP GR UR: >=1.03 (ref 1–1.03)
UROBILINOGEN UR STRIP-MCNC: 1 MG/DL (ref 0.2–1)
WBC #/AREA URNS HPF: ABNORMAL /HPF (ref 0–5)

## 2020-12-18 ENCOUNTER — HOSPITAL ENCOUNTER (OUTPATIENT)
Dept: PREADMISSION TESTING | Age: 40
Discharge: HOME | End: 2020-12-18
Payer: OTHER GOVERNMENT

## 2020-12-18 LAB — SARS-COV-2, COV2: NORMAL

## 2020-12-18 PROCEDURE — 87635 SARS-COV-2 COVID-19 AMP PRB: CPT

## 2020-12-19 LAB — SARS-COV-2, COV2NT: NOT DETECTED

## 2020-12-21 ENCOUNTER — HOSPITAL ENCOUNTER (OUTPATIENT)
Age: 40
Discharge: HOME | End: 2020-12-21
Attending: UROLOGY | Admitting: UROLOGY
Payer: OTHER GOVERNMENT

## 2020-12-21 ENCOUNTER — APPOINTMENT (OUTPATIENT)
Dept: GENERAL RADIOLOGY | Age: 40
End: 2020-12-21
Attending: UROLOGY
Payer: OTHER GOVERNMENT

## 2020-12-21 ENCOUNTER — ANESTHESIA (OUTPATIENT)
Dept: SURGERY | Age: 40
End: 2020-12-21
Payer: OTHER GOVERNMENT

## 2020-12-21 ENCOUNTER — ANESTHESIA EVENT (OUTPATIENT)
Dept: SURGERY | Age: 40
End: 2020-12-21
Payer: OTHER GOVERNMENT

## 2020-12-21 VITALS
WEIGHT: 170 LBS | SYSTOLIC BLOOD PRESSURE: 116 MMHG | HEART RATE: 58 BPM | HEIGHT: 68 IN | RESPIRATION RATE: 18 BRPM | DIASTOLIC BLOOD PRESSURE: 71 MMHG | OXYGEN SATURATION: 99 % | BODY MASS INDEX: 25.76 KG/M2 | TEMPERATURE: 97 F

## 2020-12-21 DIAGNOSIS — N28.1 RENAL CYST: Primary | ICD-10-CM

## 2020-12-21 LAB — HCG UR QL: NEGATIVE

## 2020-12-21 PROCEDURE — 74011250636 HC RX REV CODE- 250/636: Performed by: UROLOGY

## 2020-12-21 PROCEDURE — 2709999900 HC NON-CHARGEABLE SUPPLY: Performed by: UROLOGY

## 2020-12-21 PROCEDURE — 74011250637 HC RX REV CODE- 250/637: Performed by: ANESTHESIOLOGY

## 2020-12-21 PROCEDURE — 88305 TISSUE EXAM BY PATHOLOGIST: CPT

## 2020-12-21 PROCEDURE — 81025 URINE PREGNANCY TEST: CPT

## 2020-12-21 PROCEDURE — 76060000032 HC ANESTHESIA 0.5 TO 1 HR: Performed by: UROLOGY

## 2020-12-21 PROCEDURE — 74011000250 HC RX REV CODE- 250: Performed by: NURSE ANESTHETIST, CERTIFIED REGISTERED

## 2020-12-21 PROCEDURE — 77030010509 HC AIRWY LMA MSK TELE -A: Performed by: ANESTHESIOLOGY

## 2020-12-21 PROCEDURE — 74011250636 HC RX REV CODE- 250/636: Performed by: ANESTHESIOLOGY

## 2020-12-21 PROCEDURE — 74011250636 HC RX REV CODE- 250/636: Performed by: NURSE ANESTHETIST, CERTIFIED REGISTERED

## 2020-12-21 PROCEDURE — 76210000006 HC OR PH I REC 0.5 TO 1 HR: Performed by: UROLOGY

## 2020-12-21 PROCEDURE — 76010000138 HC OR TIME 0.5 TO 1 HR: Performed by: UROLOGY

## 2020-12-21 PROCEDURE — 77030010509 HC AIRWY LMA MSK TELE -A: Performed by: NURSE ANESTHETIST, CERTIFIED REGISTERED

## 2020-12-21 PROCEDURE — 76000 FLUOROSCOPY <1 HR PHYS/QHP: CPT

## 2020-12-21 PROCEDURE — 52204 CYSTOSCOPY W/BIOPSY(S): CPT | Performed by: UROLOGY

## 2020-12-21 PROCEDURE — 77030018706 HC CORD MPLR COVD -A: Performed by: UROLOGY

## 2020-12-21 PROCEDURE — C1758 CATHETER, URETERAL: HCPCS | Performed by: UROLOGY

## 2020-12-21 PROCEDURE — 76210000021 HC REC RM PH II 0.5 TO 1 HR: Performed by: UROLOGY

## 2020-12-21 RX ORDER — PROPOFOL 10 MG/ML
INJECTION, EMULSION INTRAVENOUS AS NEEDED
Status: DISCONTINUED | OUTPATIENT
Start: 2020-12-21 | End: 2020-12-21 | Stop reason: HOSPADM

## 2020-12-21 RX ORDER — LIDOCAINE HYDROCHLORIDE 20 MG/ML
INJECTION, SOLUTION EPIDURAL; INFILTRATION; INTRACAUDAL; PERINEURAL AS NEEDED
Status: DISCONTINUED | OUTPATIENT
Start: 2020-12-21 | End: 2020-12-21 | Stop reason: HOSPADM

## 2020-12-21 RX ORDER — FENTANYL CITRATE 50 UG/ML
25 INJECTION, SOLUTION INTRAMUSCULAR; INTRAVENOUS
Status: DISCONTINUED | OUTPATIENT
Start: 2020-12-21 | End: 2020-12-21 | Stop reason: HOSPADM

## 2020-12-21 RX ORDER — NORETHINDRONE AND ETHINYL ESTRADIOL 0.5-0.035
5 KIT ORAL AS NEEDED
Status: DISCONTINUED | OUTPATIENT
Start: 2020-12-21 | End: 2020-12-21 | Stop reason: HOSPADM

## 2020-12-21 RX ORDER — ONDANSETRON 2 MG/ML
INJECTION INTRAMUSCULAR; INTRAVENOUS AS NEEDED
Status: DISCONTINUED | OUTPATIENT
Start: 2020-12-21 | End: 2020-12-21 | Stop reason: HOSPADM

## 2020-12-21 RX ORDER — ONDANSETRON 2 MG/ML
4 INJECTION INTRAMUSCULAR; INTRAVENOUS AS NEEDED
Status: DISCONTINUED | OUTPATIENT
Start: 2020-12-21 | End: 2020-12-21 | Stop reason: HOSPADM

## 2020-12-21 RX ORDER — SCOLOPAMINE TRANSDERMAL SYSTEM 1 MG/1
1 PATCH, EXTENDED RELEASE TRANSDERMAL
Status: DISCONTINUED | OUTPATIENT
Start: 2020-12-21 | End: 2020-12-22 | Stop reason: HOSPADM

## 2020-12-21 RX ORDER — DEXAMETHASONE SODIUM PHOSPHATE 4 MG/ML
INJECTION, SOLUTION INTRA-ARTICULAR; INTRALESIONAL; INTRAMUSCULAR; INTRAVENOUS; SOFT TISSUE AS NEEDED
Status: DISCONTINUED | OUTPATIENT
Start: 2020-12-21 | End: 2020-12-21 | Stop reason: HOSPADM

## 2020-12-21 RX ORDER — LEVOFLOXACIN 5 MG/ML
500 INJECTION, SOLUTION INTRAVENOUS ONCE
Status: COMPLETED | OUTPATIENT
Start: 2020-12-21 | End: 2020-12-21

## 2020-12-21 RX ORDER — HYDROMORPHONE HYDROCHLORIDE 1 MG/ML
0.5 INJECTION, SOLUTION INTRAMUSCULAR; INTRAVENOUS; SUBCUTANEOUS
Status: DISCONTINUED | OUTPATIENT
Start: 2020-12-21 | End: 2020-12-21 | Stop reason: HOSPADM

## 2020-12-21 RX ORDER — SODIUM CHLORIDE, SODIUM LACTATE, POTASSIUM CHLORIDE, CALCIUM CHLORIDE 600; 310; 30; 20 MG/100ML; MG/100ML; MG/100ML; MG/100ML
20 INJECTION, SOLUTION INTRAVENOUS CONTINUOUS
Status: DISCONTINUED | OUTPATIENT
Start: 2020-12-21 | End: 2020-12-21 | Stop reason: HOSPADM

## 2020-12-21 RX ORDER — ALBUTEROL SULFATE 2.5 MG/.5ML
2.5 SOLUTION RESPIRATORY (INHALATION) AS NEEDED
Status: DISCONTINUED | OUTPATIENT
Start: 2020-12-21 | End: 2020-12-21 | Stop reason: HOSPADM

## 2020-12-21 RX ORDER — SODIUM CHLORIDE 0.9 % (FLUSH) 0.9 %
5-40 SYRINGE (ML) INJECTION AS NEEDED
Status: DISCONTINUED | OUTPATIENT
Start: 2020-12-21 | End: 2020-12-21 | Stop reason: HOSPADM

## 2020-12-21 RX ORDER — OXYCODONE AND ACETAMINOPHEN 5; 325 MG/1; MG/1
2 TABLET ORAL AS NEEDED
Status: DISCONTINUED | OUTPATIENT
Start: 2020-12-21 | End: 2020-12-21 | Stop reason: HOSPADM

## 2020-12-21 RX ADMIN — LEVOFLOXACIN 500 MG: 5 INJECTION, SOLUTION INTRAVENOUS at 12:55

## 2020-12-21 RX ADMIN — SODIUM CHLORIDE, POTASSIUM CHLORIDE, SODIUM LACTATE AND CALCIUM CHLORIDE 20 ML/HR: 600; 310; 30; 20 INJECTION, SOLUTION INTRAVENOUS at 11:03

## 2020-12-21 RX ADMIN — LIDOCAINE HYDROCHLORIDE 60 MG: 20 INJECTION, SOLUTION EPIDURAL; INFILTRATION; INTRACAUDAL; PERINEURAL at 12:49

## 2020-12-21 RX ADMIN — PROPOFOL 50 MG: 10 INJECTION, EMULSION INTRAVENOUS at 12:51

## 2020-12-21 RX ADMIN — LIDOCAINE HYDROCHLORIDE 40 MG: 20 INJECTION, SOLUTION EPIDURAL; INFILTRATION; INTRACAUDAL; PERINEURAL at 12:51

## 2020-12-21 RX ADMIN — PROPOFOL 150 MG: 10 INJECTION, EMULSION INTRAVENOUS at 12:50

## 2020-12-21 RX ADMIN — FENTANYL CITRATE 25 MCG: 50 INJECTION INTRAMUSCULAR; INTRAVENOUS at 13:49

## 2020-12-21 RX ADMIN — ONDANSETRON 4 MG: 2 INJECTION INTRAMUSCULAR; INTRAVENOUS at 13:02

## 2020-12-21 RX ADMIN — FENTANYL CITRATE 25 MCG: 50 INJECTION INTRAMUSCULAR; INTRAVENOUS at 13:39

## 2020-12-21 RX ADMIN — DEXAMETHASONE SODIUM PHOSPHATE 8 MG: 4 INJECTION, SOLUTION INTRA-ARTICULAR; INTRALESIONAL; INTRAMUSCULAR; INTRAVENOUS; SOFT TISSUE at 13:01

## 2020-12-21 NOTE — DISCHARGE INSTRUCTIONS
Patient Education     Patient Education        Cystoscopy: What to Expect at Home  Your Recovery     A cystoscopy is a procedure that lets a doctor look inside of the bladder and the urethra. The urethra is the tube that carries urine from the bladder to outside the body. The doctor uses a thin, lighted tool called a cystoscope. Your bladder is filled with fluid. This stretches the bladder so that your doctor can look closely at the inside of your bladder. After the cystoscopy, your urethra may be sore at first, and it may burn when you urinate for the first few days after the procedure. You may feel the need to urinate more often, and your urine may be pink. These symptoms should get better in 1 or 2 days. You will probably be able to go back to most of your usual activities in 1 or 2 days. This care sheet gives you a general idea about how long it will take for you to recover. But each person recovers at a different pace. Follow the steps below to get better as quickly as possible. How can you care for yourself at home? Activity    · Rest when you feel tired. Getting enough sleep will help you recover.     · Try to walk each day. Start by walking a little more than you did the day before. Bit by bit, increase the amount you walk. Walking boosts blood flow and helps prevent pneumonia and constipation.     · Avoid strenuous activities, such as bicycle riding, jogging, weight lifting, or aerobic exercise, until your doctor says it is okay.     · Ask your doctor when you can drive again.     · Most people are able to return to work within 1 or 2 days after the procedure.     · You may shower and take baths as usual.     · Ask your doctor when it is okay for you to have sex. Diet    · You can eat your normal diet. If your stomach is upset, try bland, low-fat foods like plain rice, broiled chicken, toast, and yogurt.     · Drink plenty of fluids (unless your doctor tells you not to).    Medicines    · Take pain medicines exactly as directed. ? If the doctor gave you a prescription medicine for pain, take it as prescribed. ? If you are not taking a prescription pain medicine, ask your doctor if you can take an over-the-counter medicine.     · If you think your pain medicine is making you sick to your stomach:  ? Take your medicine after meals (unless your doctor has told you not to). ? Ask your doctor for a different pain medicine.     · If your doctor prescribed antibiotics, take them as directed. Do not stop taking them just because you feel better. You need to take the full course of antibiotics. Follow-up care is a key part of your treatment and safety. Be sure to make and go to all appointments, and call your doctor if you are having problems. It's also a good idea to know your test results and keep a list of the medicines you take. When should you call for help? Call 911 anytime you think you may need emergency care. For example, call if:    · You passed out (lost consciousness).     · You have severe trouble breathing.     · You have sudden chest pain and shortness of breath, or you cough up blood.     · You have severe belly pain. Call your doctor now or seek immediate medical care if:    · You are sick to your stomach or cannot keep fluids down.     · Your urine is still red or you see blood clots after you have urinated several times.     · You have trouble passing urine or stool, especially if you have pain or swelling in your lower belly.     · You have signs of a blood clot, such as:  ? Pain in your calf, back of the knee, thigh, or groin. ? Redness and swelling in your leg or groin.     · You develop a fever or severe chills.     · You have pain in your back just below your rib cage. This is called flank pain. Watch closely for changes in your health, and be sure to contact your doctor if:    · You have pain or burning when you urinate.  A burning feeling is normal for a day or two after the test, but call if it does not get better.     · You have a frequent urge to urinate but can pass only small amounts of urine.     · Your urine is pink, red, or cloudy, or smells bad. It is normal for the urine to have a pinkish color for a few days after the test, but call if it does not get better. Where can you learn more? Go to http://www.gray.com/  Enter C842 in the search box to learn more about \"Cystoscopy: What to Expect at Home. \"  Current as of: June 29, 2020               Content Version: 12.6  © 4459-5568 [employer]. Care instructions adapted under license by [employer] (which disclaims liability or warranty for this information). If you have questions about a medical condition or this instruction, always ask your healthcare professional. [address] any warranty or liability for your use of this information. Learning About Anesthesia  What is anesthesia? Patient Education        Cystoscopy: What to Expect at [address] [facility]     A cystoscopy is a procedure that lets a doctor look inside of the bladder and the urethra. The urethra is the tube that carries urine from the bladder to outside the body. The doctor uses a thin, lighted tool called a cystoscope. Your bladder is filled with fluid. This stretches the bladder so that your doctor can look closely at the inside of your bladder. After the cystoscopy, your urethra may be sore at first, and it may burn when you urinate for the first few days after the procedure. You may feel the need to urinate more often, and your urine may be pink. These symptoms should get better in 1 or 2 days. You will probably be able to go back to most of your usual activities in 1 or 2 days. This care sheet gives you a general idea about how long it will take for you to recover. But each person recovers at a different pace. Follow the steps below to get better as quickly as possible.   How can you care for yourself at home? Activity    · Rest when you feel tired. Getting enough sleep will help you recover.     · Try to walk each day. Start by walking a little more than you did the day before. Bit by bit, increase the amount you walk. Walking boosts blood flow and helps prevent pneumonia and constipation.     · Avoid strenuous activities, such as bicycle riding, jogging, weight lifting, or aerobic exercise, until your doctor says it is okay.     · Ask your doctor when you can drive again.     · Most people are able to return to work within 1 or 2 days after the procedure.     · You may shower and take baths as usual.     · Ask your doctor when it is okay for you to have sex. Diet    · You can eat your normal diet. If your stomach is upset, try bland, low-fat foods like plain rice, broiled chicken, toast, and yogurt.     · Drink plenty of fluids (unless your doctor tells you not to). Medicines    · Take pain medicines exactly as directed. ? If the doctor gave you a prescription medicine for pain, take it as prescribed. ? If you are not taking a prescription pain medicine, ask your doctor if you can take an over-the-counter medicine.     · If you think your pain medicine is making you sick to your stomach:  ? Take your medicine after meals (unless your doctor has told you not to). ? Ask your doctor for a different pain medicine.     · If your doctor prescribed antibiotics, take them as directed. Do not stop taking them just because you feel better. You need to take the full course of antibiotics. Follow-up care is a key part of your treatment and safety. Be sure to make and go to all appointments, and call your doctor if you are having problems. It's also a good idea to know your test results and keep a list of the medicines you take. When should you call for help? Call 911 anytime you think you may need emergency care. For example, call if:    · You passed out (lost consciousness).      · You have severe trouble breathing.     · You have sudden chest pain and shortness of breath, or you cough up blood.     · You have severe belly pain. Call your doctor now or seek immediate medical care if:    · You are sick to your stomach or cannot keep fluids down.     · Your urine is still red or you see blood clots after you have urinated several times.     · You have trouble passing urine or stool, especially if you have pain or swelling in your lower belly.     · You have signs of a blood clot, such as:  ? Pain in your calf, back of the knee, thigh, or groin. ? Redness and swelling in your leg or groin.     · You develop a fever or severe chills.     · You have pain in your back just below your rib cage. This is called flank pain. Watch closely for changes in your health, and be sure to contact your doctor if:    · You have pain or burning when you urinate. A burning feeling is normal for a day or two after the test, but call if it does not get better.     · You have a frequent urge to urinate but can pass only small amounts of urine.     · Your urine is pink, red, or cloudy, or smells bad. It is normal for the urine to have a pinkish color for a few days after the test, but call if it does not get better. Where can you learn more? Go to http://www.gray.com/  Enter C842 in the search box to learn more about \"Cystoscopy: What to Expect at Home. \"  Current as of: June 29, 2020               Content Version: 12.6  © 2006-2020 [employer]wise, [facility]. Care instructions adapted under license by [employer] (which disclaims liability or warranty for this information). If you have questions about a medical condition or this instruction, always ask your healthcare professional. [address] any warranty or liability for your use of this information. Anesthesia controls pain.  And it keeps all your organs working normally during surgery or another kind of procedure. Anesthesia can relax you. It can also make you sleepy or forgetful. Or it may make you unconscious. It depends on what kind you get. Your anesthesia provider (anesthesiologist or nurse anesthetist) will make sure you are comfortable and safe during the procedure or surgery. There are different types of anesthesia. · Local anesthesia. This type numbs a small part of the body. Doctors use it for simple procedures. ? You get a shot in the area the doctor will work on.  ? You will feel some pressure during the procedure. ? You may stay awake. Or you may get medicine to help you relax or sleep. · Regional anesthesia. This type blocks pain to a larger area of the body. It can also help relieve pain right after surgery. And it may reduce your need for other pain medicine after surgery. There are different types. They include:  ? Peripheral nerve block. This is a shot near a specific nerve or group of nerves. It blocks pain in the part of the body supplied by the nerve. This is often used for procedures on the hands, arms, feet, legs, or face. ? Epidural and spinal anesthesia. This is a shot near the spinal cord and the nerves around it. It blocks pain from an entire area of the body, such as the belly, hips, or legs. · General anesthesia. This type affects the brain and the whole body. You may get it through a small tube placed in a vein (IV). Or you may breathe it in. You are unconscious and will not feel pain. During the surgery, you will be comfortable. Later, you will not remember much about the surgery. What type will you have? The type of anesthesia you have depends on many things, such as:  · The type of surgery or procedure and the reason you are having it. · Test results, such as blood tests. · How worried you feel about the surgery. · Your health. Your doctor and nurses will ask you about any past surgeries.  They will ask about any health problems you may have, such as diabetes, lung or heart disease, or a history of stroke. They will want to know if you take medicine, such as blood thinners. Your doctor may also ask if any family members have had any problems with anesthesia. You will talk with your anesthesia provider about your options. In many cases, you may be able to choose the type of anesthesia you have. What are the risks of anesthesia? Major side effects are not common. But all types of anesthesia have some risk. Your risk depends on your overall health. It also depends on the type of anesthesia you have and how you respond to it. Serious but rare risks include breathing problems, heart attack, stroke, and reaction to the medicine. Some health conditions increase the risk of problems. Your anesthesia provider will find out about any health problems you have that may affect your care. Your anesthesia provider will closely watch your vital signs during anesthesia and surgery. This includes checking your blood pressure and heart rate. This may help you avoid problems from anesthesia. What can you do to prepare? You will get a list of instructions to help you prepare. Your doctor will let you know what to expect when you get to the hospital, during the surgery, and after. You will get instructions about when to stop eating and drinking. If you take medicine, you will get instructions about what you can and can't take before surgery. You will be asked to sign a consent form that says you understand the risks of anesthesia. Before you do, your anesthesia provider will talk with you about the best type for you and the risks and benefits of that type. Many people are nervous before they have anesthesia and surgery. Ask your doctor about ways to relax before surgery. These may include relaxation exercises or medicine. What can you expect after having anesthesia? Right after the surgery, you will be in the recovery room. Nurses will make sure you are comfortable.  As the anesthesia wears off, you may feel some pain and discomfort from your surgery. Tell someone if you have pain. Pain medicine works better if you take it before the pain gets bad. You may feel some of the effects of anesthesia for a while. It takes time for the effects of the medicine to completely wear off. · If you had local or regional anesthesia you may feel numb and have less feeling in part of your body. It may also take a few hours for you to be able to move and control your muscles as usual.  · When you first wake up from general anesthesia, you may be confused. Or it may be hard to think clearly. This is normal.  · Don't do anything for 24 hours that requires attention to detail. This includes going to work, making important decisions, or signing any legal documents. Other common side effects of anesthesia include:  · Nausea and vomiting. This does not usually last long. It can be treated with medicine. · A slight drop in body temperature. You may feel cold and shiver when you first wake up. · A sore throat, if you had general anesthesia. · Muscle aches or weakness. · Feeling tired. For minor surgeries, you may go home the same day. For other surgeries you may stay in the hospital. Your doctor will check on your recovery from the anesthesia. He or she will answer any questions you may have. Follow-up care is a key part of your treatment and safety. Be sure to make and go to all appointments, and call your doctor if you are having problems. It's also a good idea to know your test results and keep a list of the medicines you take. Where can you learn more? Go to http://www.gray.com/  Enter V817 in the search box to learn more about \"Learning About Anesthesia. \"  Current as of: August 22, 2019               Content Version: 12.6  © 3429-0896 [employer].    Care instructions adapted under license by [employer] (which disclaims liability or warranty for this information). If you have questions about a medical condition or this instruction, always ask your healthcare professional. [place] [address] any warranty or liability for your use of this information.

## 2020-12-21 NOTE — ANESTHESIA PREPROCEDURE EVALUATION
Relevant Problems   RENAL FAILURE   (+) Nephrocalcinosis   (+) Renal cyst       Anesthetic History     PONV          Review of Systems / Medical History  Patient summary reviewed, nursing notes reviewed and pertinent labs reviewed    Pulmonary  Within defined limits                 Neuro/Psych         Psychiatric history     Cardiovascular    Hypertension        Dysrhythmias            GI/Hepatic/Renal     GERD           Endo/Other      Hypothyroidism  Arthritis     Other Findings   Comments: Allergies  Allegra (Fexofenadine), Amoxicillin, Benadryl (Diphenhydramine Hcl)  Ht: 5' 8\" (172.7 cm)  Weight: 77.1 kg (170 lb)  BMI: 25.85 kg/m²  Procedure  CYSTOURETHROSCOPY WITH BILATERAL RETROGRADES PYELOGRAM (Bilateral )    Medical History  History of esophagogastroduodenoscopy (EGD)  GERD (gastroesophageal reflux disease)  Nausea & vomiting  Hypertension  Arrhythmia  Thyroid disease  Psychiatric disorder  Hypothyroidism  Thromboembolus (HCC)  Arthritis  Chronic pain  Motion sickness           Physical Exam    Airway  Mallampati: II  TM Distance: 4 - 6 cm  Neck ROM: normal range of motion   Mouth opening: Normal     Cardiovascular    Rhythm: regular  Rate: normal         Dental  No notable dental hx       Pulmonary  Breath sounds clear to auscultation               Abdominal  GI exam deferred       Other Findings   Comments: Results for Nina Roberts (MRN [id number]) as of 12/21/2020 11:59    12/21/2020 10:26  HCG urine, QL: Negative         Anesthetic Plan    ASA: 3  Anesthesia type: general          Induction: Intravenous  Anesthetic plan and risks discussed with: Patient

## 2020-12-21 NOTE — OP NOTES
UROLOGY OPERATIVE NOTE    Patient: Perri Ackerman MRN: [id number]  SSN: [id number]    YOB: 1980  Age: 45 y.o. Sex: female          Pre-operative Diagnosis: Gross hematuria [R31.0]  Post-operative Diagnosis: Gross hematuria [R31.0]  Procedure: Cystoscopy, retrograde pyelograms  bladder biopsies    Surgeon: Elias Lacy MD    Anesthesia:  General  Findings: Left and right retrograde pyelograms: Normal-appearing ureters and calyces. There was no hydronephrosis or filling defects or strictures seen. The bladder had glomerulation after distention. Biopsies were taken. Estimated Blood Loss:       Scant  Drains: None    Specimens: Bladder biopsies  Implants: * No implants in log *  Complications: none           Procedure Details: The patient was seen in the pre-operative area. The risks, benefits, complications, alternative treatment options, and expected outcomes were again discussed with the patient. The possibilities of reaction to medication, pain, infection, bleeding, major cardiovascular event, death, damage to surrounding structures were specifically addressed. Informed consent was then obtained. Upon arrival to the operative suite, the patient, procedure, and side were confirmed via a pre-operative \"time-out\". All were in agreement. The patient was carefully positioned and anesthesia was undertaken. Sterile prep and drape was accomplished. The patient was in the lithotomy position. Using a 21 [place]ian cystoscope with 30 and 70 degree lenses complete cystoscopy was performed. The urethra was unremarkable. The bladder mucosa was normal in appearance without tumors, lesions or trabeculation seen. The ureteral orifices were seen on either side. Using an open-ended catheter the left then right ureteral orifice was cannulated. Using Isovue a gentle retrograde pyelogram was performed. The ureters appeared patent without stricturing.   There were no fixed filling defects, stones, strictures or hydronephrosis seen. The calyces were sharp. There was clear reflux out of the ureters bilaterally. Of note the bladder had glomerulations and slight pinking after distention. I decided form bladder biopsies to assess for dysplasia. Using cold cup biopsy forceps random bladder biopsies were taken from these areas posteriorly, the dome and laterally. Bugbee cautery used to fulgurate the biopsy sites. Hemostasis was excellent. At the conclusion of the case, all needle counts, instrument counts, and sponge counts were correct. The patient was transported in stable condition to recovery.      Wolfgang Crespo MD

## 2020-12-21 NOTE — PERIOP NOTES
TRANSFER - OUT REPORT:    Verbal report given to Abel Bowers RN on [employer]  being transferred to [facility] room 22 for routine post - op       Report consisted of patients Situation, Background, Assessment and   Recommendations(SBAR). Information from the following report(s) SBAR, Procedure Summary and MAR was reviewed with the receiving nurse. Opportunity for questions and clarification was provided.       Patient transported with:   Registered Nurse

## 2020-12-23 NOTE — ANESTHESIA POSTPROCEDURE EVALUATION
Procedure(s):  CYSTOURETHROSCOPY WITH BILATERAL RETROGRADES PYELOGRAM, BLADDER BIOPSY.     general    Anesthesia Post Evaluation      Multimodal analgesia: multimodal analgesia not used between 6 hours prior to anesthesia start to PACU discharge  Patient location during evaluation: PACU  Patient participation: complete - patient participated  Level of consciousness: awake and alert  Pain score: 1  Pain management: adequate  Airway patency: patent  Anesthetic complications: no  Cardiovascular status: acceptable, blood pressure returned to baseline and hemodynamically stable  Respiratory status: acceptable, spontaneous ventilation, nonlabored ventilation, unassisted and room air  Hydration status: acceptable  Post anesthesia nausea and vomiting:  none  Final Post Anesthesia Temperature Assessment:  Normothermia (36.0-37.5 degrees C)      INITIAL Post-op Vital signs:   Vitals Value Taken Time   /79 12/21/20 1355   Temp 36.1 °C (97 °F) 12/21/20 1320   Pulse 67 12/21/20 1355   Resp 17 12/21/20 1355   SpO2 100 % 12/21/20 1355

## 2020-12-28 ENCOUNTER — TELEPHONE (OUTPATIENT)
Dept: UROLOGY | Age: 40
End: 2020-12-28

## 2020-12-28 NOTE — TELEPHONE ENCOUNTER
Pt left a couple voicemail stating she has had pain when she urinates and a tempeture for the last few days,   Pt is post of December 21st - retrograde and bladder biopsy per biju pt probably has a UTI she wants her to noemi ang a urine sample for testing.  Advised pt she said she would today or tomorrow

## 2020-12-29 ENCOUNTER — OFFICE VISIT (OUTPATIENT)
Dept: UROLOGY | Age: 40
End: 2020-12-29
Payer: OTHER GOVERNMENT

## 2020-12-29 DIAGNOSIS — N39.0 URINARY TRACT INFECTION WITH HEMATURIA, SITE UNSPECIFIED: Primary | ICD-10-CM

## 2020-12-29 DIAGNOSIS — R31.9 URINARY TRACT INFECTION WITH HEMATURIA, SITE UNSPECIFIED: Primary | ICD-10-CM

## 2020-12-29 LAB
BILIRUB UR QL STRIP: NORMAL
GLUCOSE UR-MCNC: NEGATIVE MG/DL
KETONES P FAST UR STRIP-MCNC: NORMAL MG/DL
PH UR STRIP: 5.5 [PH] (ref 4.6–8)
PROT UR QL STRIP: NORMAL
SP GR UR STRIP: 1.03 (ref 1–1.03)
UA UROBILINOGEN AMB POC: NORMAL (ref 0.2–1)
URINALYSIS CLARITY POC: CLEAR
URINALYSIS COLOR POC: YELLOW
URINE BLOOD POC: NORMAL
URINE LEUKOCYTES POC: NORMAL
URINE NITRITES POC: NEGATIVE

## 2020-12-29 PROCEDURE — 81003 URINALYSIS AUTO W/O SCOPE: CPT | Performed by: UROLOGY

## 2020-12-31 LAB
APPEARANCE UR: CLEAR
BACTERIA #/AREA URNS HPF: ABNORMAL /[HPF]
BACTERIA UR CULT: NO GROWTH
BILIRUB UR QL STRIP: NEGATIVE
CASTS URNS QL MICRO: ABNORMAL /LPF
COLOR UR: YELLOW
EPI CELLS #/AREA URNS HPF: ABNORMAL /HPF (ref 0–10)
GLUCOSE UR QL: NEGATIVE
HGB UR QL STRIP: ABNORMAL
KETONES UR QL STRIP: ABNORMAL
LEUKOCYTE ESTERASE UR QL STRIP: ABNORMAL
MICRO URNS: ABNORMAL
MUCOUS THREADS URNS QL MICRO: PRESENT
NITRITE UR QL STRIP: NEGATIVE
PH UR STRIP: 5 [PH] (ref 5–7.5)
PROT UR QL STRIP: ABNORMAL
RBC #/AREA URNS HPF: >30 /HPF (ref 0–2)
SP GR UR: >=1.03 (ref 1–1.03)
UROBILINOGEN UR STRIP-MCNC: 1 MG/DL (ref 0.2–1)
WBC #/AREA URNS HPF: >30 /HPF (ref 0–5)

## 2021-01-05 ENCOUNTER — TELEPHONE (OUTPATIENT)
Dept: UROLOGY | Age: 41
End: 2021-01-05

## 2021-01-05 ENCOUNTER — HOSPITAL ENCOUNTER (OUTPATIENT)
Dept: CT IMAGING | Age: 41
Discharge: HOME | End: 2021-01-05
Attending: UROLOGY
Payer: OTHER GOVERNMENT

## 2021-01-05 DIAGNOSIS — N28.1 RENAL CYST: ICD-10-CM

## 2021-01-05 PROCEDURE — 74011000636 HC RX REV CODE- 636: Performed by: UROLOGY

## 2021-01-05 PROCEDURE — 74177 CT ABD & PELVIS W/CONTRAST: CPT

## 2021-01-05 RX ADMIN — IOPAMIDOL 100 ML: 755 INJECTION, SOLUTION INTRAVENOUS at 10:17

## 2021-01-05 NOTE — TELEPHONE ENCOUNTER
Pt contacted the office wanting her urine results because she still feels she has a uti, her results came back normal I tried to contact her she answered and hungup.

## 2021-01-06 NOTE — TELEPHONE ENCOUNTER
cristobal today with urine results and stated due to them being normal there was no need for treatment of abx.  If she wants to further discuss this or the issues she is having she can schedule an appointment with dr iyer

## 2021-01-07 NOTE — TELEPHONE ENCOUNTER
Pt left another voicmail concerned about her results, I tried to contact her AGAIN got her voicmail. lvm asking her to contact the office to make an appointment because her labs are normal and there is nothing we can do for her. If she wants to further discuss why shes still having these issues this is something she needs to see the doctor for and should make an appointment. We can not prescribe an abx or anything due to her normal results.

## 2021-01-15 ENCOUNTER — OFFICE VISIT (OUTPATIENT)
Dept: UROLOGY | Age: 41
End: 2021-01-15
Payer: OTHER GOVERNMENT

## 2021-01-15 VITALS — WEIGHT: 172 LBS | BODY MASS INDEX: 26.07 KG/M2 | TEMPERATURE: 97.3 F | HEIGHT: 68 IN

## 2021-01-15 DIAGNOSIS — N30.10 CYSTITIS, INTERSTITIAL: ICD-10-CM

## 2021-01-15 DIAGNOSIS — N28.1 RENAL CYST: ICD-10-CM

## 2021-01-15 DIAGNOSIS — N29 NEPHROCALCINOSIS: ICD-10-CM

## 2021-01-15 DIAGNOSIS — E83.59 NEPHROCALCINOSIS: ICD-10-CM

## 2021-01-15 DIAGNOSIS — R31.0 GROSS HEMATURIA: ICD-10-CM

## 2021-01-15 LAB
BILIRUB UR QL STRIP: NEGATIVE
GLUCOSE UR-MCNC: NEGATIVE MG/DL
KETONES P FAST UR STRIP-MCNC: NEGATIVE MG/DL
PH UR STRIP: 5.5 [PH] (ref 4.6–8)
PROT UR QL STRIP: NEGATIVE
SP GR UR STRIP: 1.03 (ref 1–1.03)
UA UROBILINOGEN AMB POC: NORMAL (ref 0.2–1)
URINALYSIS CLARITY POC: CLEAR
URINALYSIS COLOR POC: YELLOW
URINE BLOOD POC: NORMAL
URINE LEUKOCYTES POC: NEGATIVE
URINE NITRITES POC: NEGATIVE

## 2021-01-15 PROCEDURE — 99214 OFFICE O/P EST MOD 30 MIN: CPT | Performed by: UROLOGY

## 2021-01-15 PROCEDURE — 81003 URINALYSIS AUTO W/O SCOPE: CPT | Performed by: UROLOGY

## 2021-01-15 RX ORDER — FLUCONAZOLE 200 MG/1
200 TABLET ORAL DAILY
Qty: 14 TAB | Refills: 1 | Status: SHIPPED | OUTPATIENT
Start: 2021-01-15 | End: 2021-01-29

## 2021-01-15 RX ORDER — OXYBUTYNIN CHLORIDE 5 MG/1
5 TABLET ORAL 2 TIMES DAILY
Qty: 60 TAB | Refills: 3 | Status: SHIPPED | OUTPATIENT
Start: 2021-01-15 | End: 2021-08-31 | Stop reason: ALTCHOICE

## 2021-01-15 NOTE — ASSESSMENT & PLAN NOTE
Possible abacterial cystitis c/w interstitial cystitis. We will try a course of diflucan. Start oxybutynin. The patient was instructed on the dosing of the medication and reason for taking it. We discussed the common and serious risks. The patient is advised to be cautious of side effects with a new medication.

## 2021-01-15 NOTE — PROGRESS NOTES
HISTORY OF PRESENT ILLNESS  Daja Johns is a 40 y.o. female.  Chief Complaint   Patient presents with   • Post OP Follow Up   • Gross Hematuria   • Renal Cyst     She has gross hematuria.  It has been a chronic problem. She has a history of kidney stones without treatment or prior urologic evaluation. It started before bariatric surgery. She has seen blood many years intermittently, >5yrs.    12/21/2020: she is s/p cystoscopy and bilateral RPG.  Left and right retrograde pyelograms: Normal-appearing ureters and calyces.  There was no hydronephrosis or filling defects or strictures seen. The bladder had glomerulation after distention.  Biopsies were taken. Pathology significant for normal urothelium with no evidence of dysplasia or neoplasm.     She reported that she was having symptoms of a UTI on 12/29/2020, however her UA did not reflect that.    CT 1/5/2021: There are 2 cysts in the left kidney. These measure 13 mm and 14 mm respectively. No definite solid component or enhancement. These measure approximately 15 Hounsfield units.      She is here today to discuss her CT and operative findings.      Since her procedure she has urgency and dysuria.  It can be variable.  It can burn at the end of urination.  She has seen blood in the urine a couple times.  She does not take NSAIDs.  She takes Tylenol for back and hip pain, 2-4 per day.      Chronic Conditions Addressed Today     1. Nephrocalcinosis     Overview      9/8/2020 CT abd/pel noncontrast with a RUP 2-3mm calcification. Stable from 2018. Left kidney with 1.1 and 1.5cm intrarenal hypodensities.     12/9/2020: Renal calcification not likely a kidney stone.  Calcification.  Stable.  Will image renal cystic lesions with contrast.          2. Renal cyst     Overview      CT 1/5/2021: There are 2 cysts in the left kidney. These measure 13 mm and 14 mm respectively. No definite solid component or enhancement. These measure approximately 15 Hounsfield  units.         3. Gross hematuria     Overview      She has gross hematuria. It has been a chronic problem. She has a history of kidney stones without treatment or prior urologic evaluation. It started before bariatric surgery. She has seen blood many years intermittently, >5yrs. 2020: she is s/p cystoscopy and bilateral PRG. Left and right retrograde pyelograms: Normal-appearing ureters and calyces. There was no hydronephrosis or filling defects or strictures seen. The bladder had glomerulation after distention. Biopsies were taken. Pathology significant for normal urothelium with no evidence of dysplasia or neoplasm. CT 2021: There are 2 cysts in the left kidney. These measure 13 mm and 14 mm respectively. No definite solid component or enhancement. These measure approximately 15 Hounsfield units. Review of Systems   All other systems reviewed and are negative.       Past Medical History:   Diagnosis Date    Arrhythmia     palpitations    Arthritis     Chronic pain     BACK PAIN LOWER    GERD (gastroesophageal reflux disease)     History of esophagogastroduodenoscopy (EGD) x2    Hypertension     Hypothyroidism     Motion sickness     Nausea & vomiting     Psychiatric disorder     anxiety    Thromboembolus ([address] 2014    right leg DVT FOLLOWING KNEE SURGERY    Thyroid disease     hypothryorid      Past Surgical History:   Procedure Laterality Date    HX  SECTION  2004    HX  SECTION  2012    HX CHOLECYSTECTOMY  2010    HX GASTRIC BYPASS  2016    HX KNEE ARTHROSCOPY Right     HX KNEE ARTHROSCOPY Right     HX KNEE ARTHROSCOPY Right     HX KNEE ARTHROSCOPY Right     HX ORTHOPAEDIC      hip surgery     HX OTHER SURGICAL  2018    body lift    HX UROLOGICAL  2020    Cystoscopy    HX UROLOGICAL  2020     retrograde pyelograms    HX UROLOGICAL  2020    bladder biopsies     Family History   Problem Relation Age of Onset  Hypertension Mother     Thyroid Disease Mother     Hypertension Father     Heart Disease Father     Lung Disease Father         COPD    Anesth Problems Father         STOPPED BREATHING    No Known Problems Sister     Heart Disease Maternal Grandmother     Cancer Maternal Grandmother         kidney cancer        Physical Exam  Vitals signs reviewed. Constitutional:       General: She is not in acute distress. Appearance: Normal appearance. She is obese. She is not ill-appearing, toxic-appearing or diaphoretic. HENT:      Head: Normocephalic and atraumatic. Nose: Nose normal.   Eyes:      Conjunctiva/sclera: Conjunctivae normal.      Pupils: Pupils are equal, round, and reactive to light. Neck:      Musculoskeletal: Normal range of motion. Pulmonary:      Effort: Pulmonary effort is normal. No respiratory distress. Breath sounds: Normal breath sounds. Neurological:      General: No focal deficit present. Mental Status: She is alert and oriented to person, place, and time. Psychiatric:         Mood and Affect: Mood normal.                     ASSESSMENT and PLAN  Diagnoses and all orders for this visit:    1. Gross hematuria  Assessment & Plan:  Evaluation of her upper and lower tracts revealed no concerning pathology. Her cultures were negative. She did have some glomerulation with distention. She may have a variant of interstitial cystitis. Orders:  -     AMB POC URINALYSIS DIP STICK AUTO W/O MICRO  -     URINALYSIS W/MICROSCOPIC    2. Cystitis, interstitial  Comments:  Possible IC  Assessment & Plan:  Possible abacterial cystitis c/w interstitial cystitis. We will try a course of diflucan. Start oxybutynin. The patient was instructed on the dosing of the medication and reason for taking it. We discussed the common and serious risks. The patient is advised to be cautious of side effects with a new medication.       Orders:  -     AMB POC URINALYSIS DIP STICK AUTO W/O MICRO  -     URINALYSIS W/MICROSCOPIC    3. Renal cyst  Assessment & Plan:  Simple appearing cysts on CT scan. 4. Nephrocalcinosis  Assessment & Plan:  Renal calcification without collecting system stone. Other orders  -     oxybutynin (DITROPAN) 5 mg tablet; Take 1 Tab by mouth two (2) times a day. -     fluconazole (DIFLUCAN) 200 mg tablet; Take 1 Tab by mouth daily for 14 days. FDA advises cautious prescribing of oral fluconazole in pregnancy. Follow-up and Dispositions    · Return in about 1 month (around 2/15/2021).          John Miner MD

## 2021-01-15 NOTE — LETTER
1/15/2021 Patient: Vita Duke YOB: 1980 Date of Visit: 1/15/2021 Blu Zimmer MD 
[address] [address] [address] [facility] [address] Via Fax: [phone] Dear Blu Zimmer MD, Thank you for referring Ms. Batsheva Mariano to [place] [address] for evaluation. My notes for this consultation are attached. If you have questions, please do not hesitate to call me. I look forward to following your patient along with you.  
 
 
Sincerely, 
 
Theodore Hendrickson MD

## 2021-01-15 NOTE — ASSESSMENT & PLAN NOTE
Evaluation of her upper and lower tracts revealed no concerning pathology. Her cultures were negative. She did have some glomerulation with distention. She may have a variant of interstitial cystitis.

## 2021-01-16 LAB
APPEARANCE UR: CLEAR
BACTERIA #/AREA URNS HPF: ABNORMAL /[HPF]
BILIRUB UR QL STRIP: NEGATIVE
CASTS URNS QL MICRO: ABNORMAL /LPF
COLOR UR: YELLOW
CRYSTALS URNS MICRO: ABNORMAL
EPI CELLS #/AREA URNS HPF: ABNORMAL /HPF (ref 0–10)
GLUCOSE UR QL: NEGATIVE
HGB UR QL STRIP: NEGATIVE
KETONES UR QL STRIP: NEGATIVE
LEUKOCYTE ESTERASE UR QL STRIP: NEGATIVE
MICRO URNS: NORMAL
MICRO URNS: NORMAL
MUCOUS THREADS URNS QL MICRO: PRESENT
NITRITE UR QL STRIP: NEGATIVE
PH UR STRIP: 5 [PH] (ref 5–7.5)
PROT UR QL STRIP: NEGATIVE
RBC #/AREA URNS HPF: ABNORMAL /HPF (ref 0–2)
SP GR UR: 1.03 (ref 1–1.03)
UNIDENT CRYS URNS QL MICRO: PRESENT
UROBILINOGEN UR STRIP-MCNC: 1 MG/DL (ref 0.2–1)
WBC #/AREA URNS HPF: ABNORMAL /HPF (ref 0–5)

## 2021-02-22 PROBLEM — R39.15 URGENCY OF MICTURITION: Status: ACTIVE | Noted: 2021-02-22

## 2021-08-31 ENCOUNTER — OFFICE VISIT (OUTPATIENT)
Dept: UROLOGY | Age: 41
End: 2021-08-31
Payer: OTHER GOVERNMENT

## 2021-08-31 VITALS
HEIGHT: 68 IN | RESPIRATION RATE: 12 BRPM | OXYGEN SATURATION: 99 % | WEIGHT: 173 LBS | DIASTOLIC BLOOD PRESSURE: 79 MMHG | BODY MASS INDEX: 26.22 KG/M2 | TEMPERATURE: 97.7 F | HEART RATE: 77 BPM | SYSTOLIC BLOOD PRESSURE: 128 MMHG

## 2021-08-31 DIAGNOSIS — R39.15 URGENCY OF MICTURITION: ICD-10-CM

## 2021-08-31 DIAGNOSIS — R31.0 GROSS HEMATURIA: ICD-10-CM

## 2021-08-31 DIAGNOSIS — N28.1 RENAL CYST: ICD-10-CM

## 2021-08-31 LAB
BILIRUB UR QL STRIP: NORMAL
GLUCOSE UR-MCNC: NEGATIVE MG/DL
KETONES P FAST UR STRIP-MCNC: NORMAL MG/DL
PH UR STRIP: 6 [PH] (ref 4.6–8)
PROT UR QL STRIP: NORMAL
SP GR UR STRIP: 1.02 (ref 1–1.03)
UA UROBILINOGEN AMB POC: NORMAL (ref 0.2–1)
URINALYSIS CLARITY POC: CLEAR
URINALYSIS COLOR POC: YELLOW
URINE BLOOD POC: NORMAL
URINE LEUKOCYTES POC: NORMAL
URINE NITRITES POC: NEGATIVE

## 2021-08-31 PROCEDURE — 81003 URINALYSIS AUTO W/O SCOPE: CPT | Performed by: UROLOGY

## 2021-08-31 PROCEDURE — 99213 OFFICE O/P EST LOW 20 MIN: CPT | Performed by: UROLOGY

## 2021-08-31 RX ORDER — FLUCONAZOLE 150 MG/1
TABLET ORAL
COMMUNITY
Start: 2021-07-22 | End: 2021-08-31 | Stop reason: ALTCHOICE

## 2021-08-31 RX ORDER — DULOXETIN HYDROCHLORIDE 30 MG/1
30 CAPSULE, DELAYED RELEASE ORAL DAILY
COMMUNITY
Start: 2021-07-19

## 2021-08-31 RX ORDER — FLUOCINONIDE 0.5 MG/G
CREAM TOPICAL
COMMUNITY
Start: 2021-05-24 | End: 2021-08-31 | Stop reason: ALTCHOICE

## 2021-08-31 NOTE — PROGRESS NOTES
HISTORY OF PRESENT ILLNESS  Ellie Baker is a 37 y.o. female. Chief Complaint   Patient presents with    Follow-up    Kidney Shelby Thang     possible UTI, Had CT completed 8/26/21/[facility], showed cysts, also went to [facility] 8/13/21 for abdominal pain. She started having lower abdominal pains radiating to her right side several weeks ago. She went to [facility] and eventually had a CT ordered  By her PCP. CT 8/26/21 at [facility] reviewed. She was found to have renal cysts, ovarian cysts, and a pulmonary nodules. 1.2 and 1.1cm renal cyst.  She has <3mm renal calculi. No ureteral stones or hydronephrosis. DJD. Sister had renal cancer, and the patient thinks a kidney cyst became cancerous. She can have severe constant pains, keeping her from sleeping. She is taking Tylenol and her usual gabapentin and duloxetine for her nerve pains. Chronic Conditions Addressed Today     1. Renal cyst     Overview      CT 1/5/2021: There are 2 cysts in the left kidney. These measure 13 mm and 14 mm respectively. No definite solid component or enhancement. These measure approximately 15 Hounsfield units. Stable. Maxi Menjivar MD)          Current Assessment & Plan      She has simple appearing renal cysts. No obvious concerns warranting follow up. She does have a family h/o renal cancer but I don't think routine imaging is warranted for this. She is very vigilant about her health and she will have undoubtedly have more imaging in her lifetime. 2. Gross hematuria     Overview      She has gross hematuria. It has been a chronic problem. She has a history of kidney stones without treatment or prior urologic evaluation. It started before bariatric surgery. She has seen blood many years intermittently, >5yrs. 12/21/2020: she is s/p cystoscopy and bilateral PRG. Left and right retrograde pyelograms: Normal-appearing ureters and calyces.   There was no hydronephrosis or filling defects or strictures seen.  The bladder had glomerulation after distention. Biopsies were taken. Pathology significant for normal urothelium with no evidence of dysplasia or neoplasm. Current Assessment & Plan       No further hematuria          3. Urgency of micturition     Overview      She has some level of ongoing urgency and dysuria. It can be variable. It can burn at the end of urination. 1/15/2021: possible abacterial cystitis c/w interstitial cystitis. We will try a course of diflucan. We can also start oxybutynin. Current Assessment & Plan       Not bothered currently. Past Medical History:    PMHx (including negatives):  has a past medical history of Arrhythmia (), Arthritis, Burning with urination, Chronic pain, GERD (gastroesophageal reflux disease), History of esophagogastroduodenoscopy (EGD) (x2), Hypertension, Hypothyroidism, Motion sickness, Nausea & vomiting, Psychiatric disorder, Thromboembolus ([address] (), and Thyroid disease. PSurgHx:  has a past surgical history that includes hx cholecystectomy (); hx gastric bypass (2016); hx other surgical (2018); hx  section (); hx  section (); hx knee arthroscopy (Right); hx knee arthroscopy (Right); hx knee arthroscopy (Right); hx knee arthroscopy (Right); hx orthopaedic; hx urological (2020); hx urological (2020); and hx urological (2020). PSocHx:  reports that she quit smoking about 1 years ago. She has a 81.00 pack-year smoking history. She has never used smokeless tobacco. She reports previous alcohol use. She reports that she does not use drugs. Review of Systems   Constitutional: Negative for fever. HENT: Negative for hearing loss. Eyes: Negative for blurred vision and double vision. Respiratory: Positive for shortness of breath (allergy related). Cardiovascular: Negative for chest pain.    Gastrointestinal: Positive for abdominal pain and nausea. Negative for heartburn and vomiting. Genitourinary: Positive for dysuria and flank pain. Negative for frequency, hematuria and urgency. Musculoskeletal: Positive for back pain. Neurological: Positive for dizziness and headaches. Endo/Heme/Allergies: Does not bruise/bleed easily. Psychiatric/Behavioral: Positive for depression (on medication). Negative for substance abuse. Physical Exam  Allergies   Allergen Reactions    Allegra [Fexofenadine] Shortness of Breath    Amoxicillin Other (comments)     Yeast infection    Benadryl [Diphenhydramine Hcl] Shortness of Breath    Ibuprofen Rash      Prior to Admission medications    Medication Sig Start Date End Date Taking? Authorizing Provider   DULoxetine (CYMBALTA) 30 mg capsule Take 30 mg by mouth daily. 7/19/21  Yes Provider, Historical   montelukast (Singulair) 10 mg tablet Take 10 mg by mouth daily. Yes Provider, Historical   loratadine (Claritin) 10 mg tablet Take 10 mg by mouth. Yes Provider, Historical   omeprazole (PRILOSEC) 20 mg capsule Take 20 mg by mouth every morning. Yes Provider, Historical   gabapentin (NEURONTIN) 300 mg capsule Take 600 mg by mouth two (2) times a day. MORNING AND NIGHT   Yes Provider, Historical   metoprolol tartrate (LOPRESSOR) 50 mg tablet Take 50 mg by mouth every morning. Yes Provider, Historical   MULTIVITAMIN PO Take 1 Tab by mouth daily. Yes Provider, Historical   venlafaxine-SR (EFFEXOR XR) 75 mg capsule Take 75 mg by mouth every morning. Yes Provider, Historical   levothyroxine (SYNTHROID) 100 mcg tablet Take  by mouth Daily (before breakfast). Yes Provider, Historical        ASSESSMENT and PLAN  Diagnoses and all orders for this visit:    1. Urgency of micturition  Assessment & Plan:   Not bothered currently. 2. Gross hematuria  Assessment & Plan:   No further hematuria       3. Renal cyst  Assessment & Plan:  She has simple appearing renal cysts.   No obvious concerns warranting follow up. She does have a family h/o renal cancer but I don't think routine imaging is warranted for this. She is very vigilant about her health and she will have undoubtedly have more imaging in her lifetime.         Other orders  -     AMB POC URINALYSIS DIP STICK AUTO W/O MICRO  -     CULTURE, URINE         Ashish Jj MD

## 2021-08-31 NOTE — ASSESSMENT & PLAN NOTE
She has simple appearing renal cysts. No obvious concerns warranting follow up. She does have a family h/o renal cancer but I don't think routine imaging is warranted for this. She is very vigilant about her health and she will have undoubtedly have more imaging in her lifetime.

## 2021-08-31 NOTE — LETTER
8/31/2021    Patient: Chhaya Potter   YOB: 1980   Date of Visit: 8/31/2021     Aidee Montejo MD   [place] [address]  Via Fax: [phone]    Dear Aidee Montejo MD,      Thank you for referring Ms. Sheyla Emery to [place] [address] for evaluation. My notes for this consultation are attached. If you have questions, please do not hesitate to call me. I look forward to following your patient along with you.       Sincerely,    Daniel Gould MD

## 2021-08-31 NOTE — PROGRESS NOTES
Chief Complaint   Patient presents with    Follow-up    Kidney Shelby Desir     possible UTI, Had CT completed 8/26/21/[facility], showed cysts, also went to [facility] 8/13/21 for abdominal pain. 1. Have you been to the ER, urgent care clinic since your last visit? Hospitalized since your last visit? Yes When: 8/13/21 Where: [facility] Reason for visit: Abdominal Pain    2. Have you seen or consulted any other health care providers outside of the [address] [place] [address] since your last visit? Include any pap smears or colon screening.  Yes When: 8/26/21 Where: [facility] Reason for visit: CT for abdominal Pain      Visit Vitals  /79 (BP 1 Location: Right upper arm, BP Patient Position: Sitting, BP Cuff Size: Adult)   Pulse 77   Temp 97.7 °F (36.5 °C) (Temporal)   Resp 12   Ht 5' 8\" (1.727 m)   Wt 173 lb (78.5 kg)   LMP  (LMP Unknown)   SpO2 99%   BMI 26.30 kg/m²

## 2021-09-02 LAB — BACTERIA UR CULT: NORMAL

## 2022-03-18 PROBLEM — E83.59 NEPHROCALCINOSIS: Status: ACTIVE | Noted: 2020-12-09

## 2022-03-18 PROBLEM — R31.0 GROSS HEMATURIA: Status: ACTIVE | Noted: 2020-12-09

## 2022-03-18 PROBLEM — N29 NEPHROCALCINOSIS: Status: ACTIVE | Noted: 2020-12-09

## 2022-03-18 PROBLEM — S73.192D ACETABULAR LABRUM TEAR, LEFT, SUBSEQUENT ENCOUNTER: Status: ACTIVE | Noted: 2020-06-15

## 2022-03-18 PROBLEM — R63.4 EXCESSIVE WEIGHT LOSS: Status: ACTIVE | Noted: 2018-07-03

## 2022-03-19 PROBLEM — M54.50 CHRONIC RIGHT-SIDED LOW BACK PAIN WITHOUT SCIATICA: Status: ACTIVE | Noted: 2020-12-09

## 2022-03-19 PROBLEM — Z86.39 HISTORY OF MORBID OBESITY: Status: ACTIVE | Noted: 2018-07-03

## 2022-03-19 PROBLEM — N28.1 RENAL CYST: Status: ACTIVE | Noted: 2020-12-09

## 2022-03-19 PROBLEM — G89.29 CHRONIC RIGHT-SIDED LOW BACK PAIN WITHOUT SCIATICA: Status: ACTIVE | Noted: 2020-12-09

## 2022-03-19 PROBLEM — N30.10 CYSTITIS, INTERSTITIAL: Status: ACTIVE | Noted: 2021-01-15

## 2022-03-20 PROBLEM — R39.15 URGENCY OF MICTURITION: Status: ACTIVE | Noted: 2021-02-22

## 2022-03-20 PROBLEM — Z41.1 ENCOUNTER FOR COSMETIC SURGERY: Status: ACTIVE | Noted: 2018-07-03

## 2022-08-12 ENCOUNTER — OFFICE VISIT (OUTPATIENT)
Dept: ORTHOPEDIC SURGERY | Age: 42
End: 2022-08-12
Payer: OTHER MISCELLANEOUS

## 2022-08-12 VITALS — HEIGHT: 68 IN | WEIGHT: 170 LBS | BODY MASS INDEX: 25.76 KG/M2

## 2022-08-12 DIAGNOSIS — M17.11 PRIMARY LOCALIZED OSTEOARTHRITIS OF RIGHT KNEE: ICD-10-CM

## 2022-08-12 DIAGNOSIS — S83.241A ACUTE MEDIAL MENISCUS TEAR OF RIGHT KNEE, INITIAL ENCOUNTER: Primary | ICD-10-CM

## 2022-08-12 PROCEDURE — 99203 OFFICE O/P NEW LOW 30 MIN: CPT | Performed by: ORTHOPAEDIC SURGERY

## 2022-08-12 RX ORDER — METHYLPREDNISOLONE 4 MG/1
TABLET ORAL
Qty: 1 DOSE PACK | Refills: 0 | Status: SHIPPED | OUTPATIENT
Start: 2022-08-12

## 2022-08-12 NOTE — PROGRESS NOTES
Geri Vera (: 1980) is a 37 y.o. female, new patient, here for evaluation of the following chief complaint(s):  Knee Pain       ASSESSMENT/PLAN:  Below is the assessment and plan developed based on review of pertinent history, physical exam, labs, studies, and medications. Findings were discussed with the patient today. We will obtain an MRI which will help us with further treatment planning including the possibility of surgical treatment. Patient will follow up after this MRI is performed. We will keep her out of work and try Medrol Dosepak to decrease his acute inflammation. 1. Acute medial meniscus tear of right knee, initial encounter  -     MRI KNEE RT WO CONT; Future      Return for imaging results after study performed. SUBJECTIVE/OBJECTIVE:  Geri Vera (: 1980) is a 37 y.o. female. She notes a recent injury that occurred at work when she fell on her right knee. This caused sharp pains at the anterior medial knee. She has had difficulty returning to normal activities and ambulation. She notes a long history with this knee which includes multiple surgeries. She has tried what sounds like a mesh meniscal implant procedure which failed. She is also tried osteochondral graft procedures. She notes that she was doing well prior to this fall and ambulating well. She had no pain prior to this fall. Allergies   Allergen Reactions    Allegra [Fexofenadine] Shortness of Breath    Amoxicillin Other (comments)     Yeast infection    Benadryl [Diphenhydramine Hcl] Shortness of Breath    Ibuprofen Rash       Current Outpatient Medications   Medication Sig    methylPREDNISolone (Medrol, Mark,) 4 mg tablet Use as directed    DULoxetine (CYMBALTA) 30 mg capsule Take 30 mg by mouth daily. montelukast (SINGULAIR) 10 mg tablet Take 10 mg by mouth daily. loratadine (CLARITIN) 10 mg tablet Take 10 mg by mouth.     omeprazole (PRILOSEC) 20 mg capsule Take 20 mg by mouth every morning.    gabapentin (NEURONTIN) 300 mg capsule Take 600 mg by mouth two (2) times a day. MORNING AND NIGHT    metoprolol tartrate (LOPRESSOR) 50 mg tablet Take 50 mg by mouth every morning. MULTIVITAMIN PO Take 1 Tab by mouth daily. venlafaxine-SR (EFFEXOR-XR) 75 mg capsule Take 75 mg by mouth every morning. levothyroxine (SYNTHROID) 100 mcg tablet Take  by mouth Daily (before breakfast). No current facility-administered medications for this visit.        Social History     Socioeconomic History    Marital status:      Spouse name: Not on file    Number of children: Not on file    Years of education: Not on file    Highest education level: Not on file   Occupational History    Not on file   Tobacco Use    Smoking status: Former     Packs/day: 3.00     Years: 27.00     Pack years: 81.00     Types: Cigarettes     Quit date: 2019     Years since quittin.9    Smokeless tobacco: Never   Vaping Use    Vaping Use: Never used   Substance and Sexual Activity    Alcohol use: Not Currently    Drug use: Never    Sexual activity: Yes     Partners: Male   Other Topics Concern    Not on file   Social History Narrative    Not on file     Social Determinants of Health     Financial Resource Strain: Not on file   Food Insecurity: Not on file   Transportation Needs: Not on file   Physical Activity: Not on file   Stress: Not on file   Social Connections: Not on file   Intimate Partner Violence: Not on file   Housing Stability: Not on file       Past Surgical History:   Procedure Laterality Date    HX  SECTION  2004    HX  SECTION  2012    HX CHOLECYSTECTOMY  2010    HX GASTRIC BYPASS  2016    HX KNEE ARTHROSCOPY Right     HX KNEE ARTHROSCOPY Right     HX KNEE ARTHROSCOPY Right     HX KNEE ARTHROSCOPY Right     HX ORTHOPAEDIC      hip surgery     HX OTHER SURGICAL  2018    body lift    HX UROLOGICAL  2020    Cystoscopy    HX UROLOGICAL  2020     retrograde pyelograms    HX UROLOGICAL  12/21/2020    bladder biopsies       Family History   Problem Relation Age of Onset    Hypertension Mother     Thyroid Disease Mother     Hypertension Father     Heart Disease Father     Lung Disease Father         COPD    Anesth Problems Father         STOPPED BREATHING    No Known Problems Sister     Heart Disease Maternal Grandmother     Cancer Maternal Grandmother         kidney cancer        OB History    No obstetric history on file. REVIEW OF SYSTEMS:  ROS    Positive for: Musculoskeletal  Last edited by Rinku Huddleston on 8/12/2022  1:46 PM.        Patient denies any recent fever, chills, nausea, vomiting, chest pain, or shortness of breath. Vitals:  Ht 5' 8\" (1.727 m)   Wt 170 lb (77.1 kg)   BMI 25.85 kg/m²    Body mass index is 25.85 kg/m². PHYSICAL EXAM:  General exam: Patient is awake, alert, and oriented x3. Well-appearing. No acute distress. Ambulates with an antalgic gait    Right knee: Neurovascular and sensory intact. There is tenderness to palpation along the medial joint line. Mild effusion is present. There is crepitus with range of motion of the knee. There is pain with India's maneuver. No obvious instability on ligamentous testing including Lachman's exam.  Stable anterior and posterior drawer. No erythema or ecchymosis. IMAGING:  X-rays of the right knee from an outside facility were reviewed and show evidence of medial compartment joint space narrowing and osteoarthritis. Osteochondral defect at the far medial anterior weightbearing medial femoral condyle. 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 XR Results (most recent):  Results from Hospital Encounter encounter on 12/21/20    XR FLUOROSCOPY UNDER 60 MINUTES    Narrative  Retrograde pyelography. 11 images are submitted by urologist performing this study.  This dictation is  made for fluoroscopic time recording. Contrast through nondilated renal collecting systems. Any filling defect may be  related to stone, clot, air bubble, or mucosal-based abnormality including  polyp. Real-time interpretation by urology. Fluoroscopic time- 38 sec      Results from [facility] encounter on 06/15/20    [facility] XR TECHNOLOGIST SERVICE    Narrative  Fluoroscopy was utilized. Impression  IMPRESSION:  FLUOROSCOPY WAS USED. Fluoro Dose:  12.44 mGy      vk      Results from Hospital Encounter encounter on 12/27/19    XR INJ ASP LARGE JOINT / BURSA    Narrative  EXAM:  XR INJ ASP LARGE JOINT / BURSA  INDICATION:  EFFUSION LEFT HIP, left hip pain. Request hip steroid injection. Fluoroscopy dose (air kerma):  29.54 mGy  FINDINGS:  The procedure was explained to the patient and verbal and written informed  consent was obtained. The skin was marked and prepped and draped in sterile  fashion and anesthetized with 1% lidocaine. Using fluoroscopic visualization, a  22-gauge spinal needle was advanced into the left hip joint. The needle position  within the joint was confirmed with injection of a small quantity of Omnipaque  180. Digital image was saved documenting this. This was followed by an injection  of 40 mg triamcinolone steroid mixed with 1 cc 0.5% bupivacaine. The needle was  removed and a bandage was applied. The patient tolerated the procedure well. Impression  IMPRESSION:  1. Left hip injected with steroid and anesthetic. 2. Patient's pain on presentation was 7-8/10. Pain after injection approximately  1/10. Orders Placed This Encounter    MRI KNEE RT WO CONT     Standing Status:   Future     Standing Expiration Date:   11/12/2022     Order Specific Question:   Is Patient Pregnant?      Answer:   No     Order Specific Question:   Arthrogram study     Answer:   No    methylPREDNISolone (Medrol, Mark,) 4 mg tablet     Sig: Use as directed     Dispense:  1 Dose Pack     Refill: 0              An electronic signature was used to authenticate this note.   -- Priscila Ran, DO

## 2022-08-12 NOTE — PATIENT INSTRUCTIONS
Date of appointment:  8/12/2022     Examining Physician: Ariane Sena      Employee information    Name:  Megan Dunlap                                          YOB: 1980                               Medical record number: [id number]      Company information      Reason for visit: Right knee injury    Follow-up Requested:   Following Test MRI    Treatment: Anti-Inflammatory medication    Work Status Restrictions: May not return to work until seen again      Signed: Merline Grain, DO

## 2022-08-24 ENCOUNTER — HOSPITAL ENCOUNTER (OUTPATIENT)
Dept: MRI IMAGING | Age: 42
Discharge: HOME | End: 2022-08-24
Payer: OTHER GOVERNMENT

## 2022-08-24 DIAGNOSIS — S83.241A ACUTE MEDIAL MENISCUS TEAR OF RIGHT KNEE, INITIAL ENCOUNTER: ICD-10-CM

## 2022-08-24 PROCEDURE — 73721 MRI JNT OF LWR EXTRE W/O DYE: CPT

## 2022-08-29 DIAGNOSIS — S83.241A ACUTE MEDIAL MENISCUS TEAR OF RIGHT KNEE, INITIAL ENCOUNTER: Primary | ICD-10-CM

## 2022-08-29 RX ORDER — DICLOFENAC SODIUM 75 MG/1
75 TABLET, DELAYED RELEASE ORAL 2 TIMES DAILY
Qty: 60 TABLET | Refills: 3 | Status: SHIPPED | OUTPATIENT
Start: 2022-08-29

## 2022-08-29 NOTE — TELEPHONE ENCOUNTER
Called to discuss recent MRI results. No answer. Left a message. Patient is scheduled for an appointment on Wednesday.     Renee Alanis,

## 2022-08-31 ENCOUNTER — TELEPHONE (OUTPATIENT)
Dept: ORTHOPEDIC SURGERY | Age: 42
End: 2022-08-31

## 2022-08-31 NOTE — TELEPHONE ENCOUNTER
Findings were discussed with the patient today by phone. We reviewed her MRI results. 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 In comparison to previous images the lesion does seem to be growing in size. Therefore, we will plan for referral to orthopedic oncology specialist.  This will help in further evaluation and treatment planning. She does still have continued pain in the knee which is likely related to her fall. This caused stress reaction and an area of pre-existing chondral change at the medial femoral condyle. I recommended continuing with modified weightbearing and we will keep her out of work for another couple of weeks. She will continue with anti-inflammatories and activity modifications. MRI Results (most recent):  Results from [facility] encounter on 08/24/22    MRI KNEE RT WO CONT    Narrative  EXAM: MRI KNEE RT WO CONT    INDICATION: Work injury falling onto right knee. Sharp anterior medial knee  pain. Previous surgeries of right knee. COMPARISON: None    TECHNIQUE: Axial T2 fat-saturated; coronal T1 and proton density fat-saturated;  and sagittal T2 fat-saturated, proton density fat-saturated, and gradient echo  MRI of the right knee . CONTRAST: None. FINDINGS: Bone marrow: There is a lobular T2 hyperintense mass of the distal  femur showing intervening areas of fat signal as well as diminished T1 and T2  signal representative calcifications within the distal femoral metaphysis. It  measures 4.1 cm in craniocaudal, 2.6 cm AP and 3.6 cm transverse. It occupies  the entire anteroposterior extent of the marrow space showing endosteal erosion  anteriorly and posteriorly. It also nearly abuts the medial endosteal margin. The lesion has imaging features of a cartilaginous tumor. Diagnostic  considerations include enchondroma and low grade chondrosarcoma. The presence of  endosteal erosions would favor a low-grade chondrosarcoma.     There is evidence for osteochondral grafting of the anterior-mid weightbearing  portion of the medial femoral condyle with solid bony incorporation showing mild  edema. There is thinning of articular cartilage of the graft demonstrated  anteriorly with underlying edema and small subcortical cysts. .    Joint fluid: Knee joint fluid volume is within normal limits. No Geiger's cyst is  shown. Collateral ligaments and posterior, lateral corner: Intact. Medial meniscus: Intact. Lateral meniscus: Intact. ACL and PCL: Intact. Tendons: Intact. Muscles: Within normal limits. Patellofemoral alignment: No patellar subluxation/tilt. Trochlear groove is not  hypoplastic. TT-TG distance: Normal.    Articular cartilage: Moderately extensive grade 2 chondral derangement within  the lateral patellar facet with foci of grade 2 derangement in the medial  patellar facet and the inferior medial trochlear facet. Medial femoral osteochondral allograft as above with subchondral stress reaction  and chondral wear. Soft tissue mass: None. Impression  1. 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 2. Osteochondral grafting of the anterior-mid weightbearing medial femoral  condyle with demonstration of chondral wear including subchondral cysts and  stress reaction. 3. Mild patellofemoral chondromalacia. 4. No meniscal, ligamentous or tendinous derangement demonstrated.     23X

## 2022-09-07 DIAGNOSIS — S83.241A ACUTE MEDIAL MENISCUS TEAR OF RIGHT KNEE, INITIAL ENCOUNTER: Primary | ICD-10-CM

## 2022-09-07 RX ORDER — TRAMADOL HYDROCHLORIDE 50 MG/1
50 TABLET ORAL
Qty: 20 TABLET | Refills: 0 | Status: SHIPPED | OUTPATIENT
Start: 2022-09-07 | End: 2022-09-14

## 2022-09-07 RX ORDER — METHYLPREDNISOLONE 4 MG/1
TABLET ORAL
Qty: 1 DOSE PACK | Refills: 0 | Status: SHIPPED | OUTPATIENT
Start: 2022-09-07

## 2022-09-19 ENCOUNTER — OFFICE VISIT (OUTPATIENT)
Dept: ORTHOPEDIC SURGERY | Age: 42
End: 2022-09-19
Payer: OTHER MISCELLANEOUS

## 2022-09-19 VITALS — HEIGHT: 68 IN | WEIGHT: 170 LBS | BODY MASS INDEX: 25.76 KG/M2

## 2022-09-19 DIAGNOSIS — M17.11 PRIMARY LOCALIZED OSTEOARTHRITIS OF RIGHT KNEE: Primary | ICD-10-CM

## 2022-09-19 PROCEDURE — 99212 OFFICE O/P EST SF 10 MIN: CPT | Performed by: ORTHOPAEDIC SURGERY

## 2022-09-19 RX ORDER — HYALURONATE SODIUM, STABILIZED 88 MG/4 ML
88 SYRINGE (ML) INTRAARTICULAR ONCE
Qty: 4 ML | Refills: 0 | Status: SHIPPED | OUTPATIENT
Start: 2022-09-19 | End: 2022-09-19

## 2022-09-19 NOTE — PATIENT INSTRUCTIONS
Date of appointment:  9/19/2022     Examining Physician: Mynor Vallejo      Employee information    Name:  Gaye Quintanilla                                          YOB: 1980                               Medical record number: [id number]      Company information      Reason for visit: Right knee    Follow-up Requested:   with joint specialist    Treatment: Anti-Inflammatory medication    Work Status Restrictions: May not return to work as there is no seated / light duty until seen by joint specialist      Signed: Alaina Mercer DO

## 2022-09-19 NOTE — LETTER
9/19/2022    Patient: Gaye Quintanilla   YOB: 1980   Date of Visit: 9/19/2022     Sanchez Polk MD  [address] [place] [address]  Via Fax: [phone]    Dear Sanchez Polk MD,      Thank you for referring Ms. Fareed Castillo to [facility] for evaluation. My notes for this consultation are attached. If you have questions, please do not hesitate to call me. I look forward to following your patient along with you.       Sincerely,    Yamila Estrada, DO

## 2022-09-19 NOTE — PROGRESS NOTES
Robin Adams (: 1980) is a 40 y.o. female, established patient, here for evaluation of the following chief complaint(s):  Knee Pain       ASSESSMENT/PLAN:  Below is the assessment and plan developed based on review of pertinent history, physical exam, labs, studies, and medications. We had a discussion today regarding further treatment options for the right knee. We will submit for viscosupplementation injection. We will also submit for an  brace of the right knee. She has medial compartment osteoarthritis. We also discussed possibility of follow-up with one of our joint replacement specialist that she notes her frustration with her continued pain despite multiple surgeries on this right knee. 1. Primary localized osteoarthritis of right knee      Return for with joint specialist.      SUBJECTIVE/OBJECTIVE:  Robin Adams (: 1980) is a 40 y.o. female. She presents through phone conversation/virtual visit today regarding her right knee pain. She continues with aching pain in the right knee and has had difficulty with daily activities and work because of her pain. She has had multiple surgeries on the right knee and had an MRI. She has a low-grade chondrosarcoma for which she was seen by orthopedic oncology recently who said this was nothing aggressive and nothing that needed oncology treatment. Allergies   Allergen Reactions    Allegra [Fexofenadine] Shortness of Breath    Amoxicillin Other (comments)     Yeast infection    Benadryl [Diphenhydramine Hcl] Shortness of Breath    Ibuprofen Rash       Current Outpatient Medications   Medication Sig    methylPREDNISolone (MEDROL DOSEPACK) 4 mg tablet Per dose pack instructions (Patient not taking: Reported on 2022)    diclofenac EC (VOLTAREN) 75 mg EC tablet Take 1 Tablet by mouth two (2) times a day.     methylPREDNISolone (Medrol, Mark,) 4 mg tablet Use as directed (Patient not taking: Reported on 2022) DULoxetine (CYMBALTA) 30 mg capsule Take 30 mg by mouth daily. montelukast (SINGULAIR) 10 mg tablet Take 10 mg by mouth daily. loratadine (CLARITIN) 10 mg tablet Take 10 mg by mouth. omeprazole (PRILOSEC) 20 mg capsule Take 20 mg by mouth every morning.    gabapentin (NEURONTIN) 300 mg capsule Take 600 mg by mouth two (2) times a day. MORNING AND NIGHT    metoprolol tartrate (LOPRESSOR) 50 mg tablet Take 50 mg by mouth every morning. MULTIVITAMIN PO Take 1 Tab by mouth daily. venlafaxine-SR (EFFEXOR-XR) 75 mg capsule Take 75 mg by mouth every morning. levothyroxine (SYNTHROID) 100 mcg tablet Take  by mouth Daily (before breakfast). No current facility-administered medications for this visit.        Social History     Socioeconomic History    Marital status:      Spouse name: Not on file    Number of children: Not on file    Years of education: Not on file    Highest education level: Not on file   Occupational History    Not on file   Tobacco Use    Smoking status: Former     Packs/day: 3.00     Years: 27.00     Pack years: 81.00     Types: Cigarettes     Quit date: 2019     Years since quitting: 3.0    Smokeless tobacco: Never   Vaping Use    Vaping Use: Never used   Substance and Sexual Activity    Alcohol use: Not Currently    Drug use: Never    Sexual activity: Yes     Partners: Male   Other Topics Concern    Not on file   Social History Narrative    Not on file     Social Determinants of Health     Financial Resource Strain: Not on file   Food Insecurity: Not on file   Transportation Needs: Not on file   Physical Activity: Not on file   Stress: Not on file   Social Connections: Not on file   Intimate Partner Violence: Not on file   Housing Stability: Not on file       Past Surgical History:   Procedure Laterality Date    HX  SECTION  2004    HX  SECTION  2012    HX CHOLECYSTECTOMY      HX GASTRIC BYPASS  2016    HX KNEE ARTHROSCOPY Right     HX KNEE ARTHROSCOPY Right     HX KNEE ARTHROSCOPY Right     HX KNEE ARTHROSCOPY Right     HX ORTHOPAEDIC      hip surgery     HX OTHER SURGICAL  07/2018    body lift    HX UROLOGICAL  12/21/2020    Cystoscopy    HX UROLOGICAL  12/21/2020     retrograde pyelograms    HX UROLOGICAL  12/21/2020    bladder biopsies       Family History   Problem Relation Age of Onset    Hypertension Mother     Thyroid Disease Mother     Hypertension Father     Heart Disease Father     Lung Disease Father         COPD    Anesth Problems Father         STOPPED BREATHING    No Known Problems Sister     Heart Disease Maternal Grandmother     Cancer Maternal Grandmother         kidney cancer        OB History    No obstetric history on file. REVIEW OF SYSTEMS:  ROS    Positive for: Musculoskeletal  Last edited by Dagmar Simons on 9/19/2022  8:51 AM.        Patient denies any recent fever, chills, nausea, vomiting, chest pain, or shortness of breath. Vitals:  Ht 5' 8\" (1.727 m)   Wt 170 lb (77.1 kg)   BMI 25.85 kg/m²    Body mass index is 25.85 kg/m². PHYSICAL EXAM:  She notes that she continues to walk with a limp. She localizes pain to the medial knee. She has pain at the medial knee with range of motion of the knee and some clicking and popping    IMAGING:  MRI Results (most recent):  Results from Hospital Encounter encounter on 08/24/22    MRI KNEE RT WO CONT    Narrative  EXAM: MRI KNEE RT WO CONT    INDICATION: Work injury falling onto right knee. Sharp anterior medial knee  pain. Previous surgeries of right knee. COMPARISON: None    TECHNIQUE: Axial T2 fat-saturated; coronal T1 and proton density fat-saturated;  and sagittal T2 fat-saturated, proton density fat-saturated, and gradient echo  MRI of the right knee . CONTRAST: None. FINDINGS: Bone marrow:  There is a lobular T2 hyperintense mass of the distal  femur showing intervening areas of fat signal as well as diminished T1 and T2  signal representative calcifications within the distal femoral metaphysis. It  measures 4.1 cm in craniocaudal, 2.6 cm AP and 3.6 cm transverse. It occupies  the entire anteroposterior extent of the marrow space showing endosteal erosion  anteriorly and posteriorly. It also nearly abuts the medial endosteal margin. The lesion has imaging features of a cartilaginous tumor. Diagnostic  considerations include enchondroma and low grade chondrosarcoma. The presence of  endosteal erosions would favor a low-grade chondrosarcoma. There is evidence for osteochondral grafting of the anterior-mid weightbearing  portion of the medial femoral condyle with solid bony incorporation showing mild  edema. There is thinning of articular cartilage of the graft demonstrated  anteriorly with underlying edema and small subcortical cysts. .    Joint fluid: Knee joint fluid volume is within normal limits. No Geiger's cyst is  shown. Collateral ligaments and posterior, lateral corner: Intact. Medial meniscus: Intact. Lateral meniscus: Intact. ACL and PCL: Intact. Tendons: Intact. Muscles: Within normal limits. Patellofemoral alignment: No patellar subluxation/tilt. Trochlear groove is not  hypoplastic. TT-TG distance: Normal.    Articular cartilage: Moderately extensive grade 2 chondral derangement within  the lateral patellar facet with foci of grade 2 derangement in the medial  patellar facet and the inferior medial trochlear facet. Medial femoral osteochondral allograft as above with subchondral stress reaction  and chondral wear. Soft tissue mass: None. Impression  1. 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 2. Osteochondral grafting of the anterior-mid weightbearing medial femoral  condyle with demonstration of chondral wear including subchondral cysts and  stress reaction. 3. Mild patellofemoral chondromalacia.   4. No meniscal, ligamentous or tendinous derangement demonstrated. 23X      XR Results (most recent):  Results from Hospital Encounter encounter on 12/21/20    XR FLUOROSCOPY UNDER 60 MINUTES    Narrative  Retrograde pyelography. 11 images are submitted by urologist performing this study. This dictation is  made for fluoroscopic time recording. Contrast through nondilated renal collecting systems. Any filling defect may be  related to stone, clot, air bubble, or mucosal-based abnormality including  polyp. Real-time interpretation by urology. Fluoroscopic time- 38 sec      Results from [place] [facility] encounter on 06/15/20    [facility] XR TECHNOLOGIST SERVICE    Narrative  Fluoroscopy was utilized. Impression  IMPRESSION:  FLUOROSCOPY WAS USED. Fluoro Dose:  12.44 mGy      vk      Results from Hospital Encounter encounter on 12/27/19    XR INJ ASP LARGE JOINT / BURSA    Narrative  EXAM:  XR INJ ASP LARGE JOINT / BURSA  INDICATION:  EFFUSION LEFT HIP, left hip pain. Request hip steroid injection. Fluoroscopy dose (air kerma):  29.54 mGy  FINDINGS:  The procedure was explained to the patient and verbal and written informed  consent was obtained. The skin was marked and prepped and draped in sterile  fashion and anesthetized with 1% lidocaine. Using fluoroscopic visualization, a  22-gauge spinal needle was advanced into the left hip joint. The needle position  within the joint was confirmed with injection of a small quantity of Omnipaque  180. Digital image was saved documenting this. This was followed by an injection  of 40 mg triamcinolone steroid mixed with 1 cc 0.5% bupivacaine. The needle was  removed and a bandage was applied. The patient tolerated the procedure well. Impression  IMPRESSION:  1. Left hip injected with steroid and anesthetic. 2. Patient's pain on presentation was 7-8/10. Pain after injection approximately  1/10. No orders of the defined types were placed in this encounter. An electronic signature was used to authenticate this note.   -- Singh Humphrey, DO

## 2022-09-26 ENCOUNTER — OFFICE VISIT (OUTPATIENT)
Dept: ORTHOPEDIC SURGERY | Age: 42
End: 2022-09-26
Payer: OTHER GOVERNMENT

## 2022-09-26 VITALS — BODY MASS INDEX: 28.04 KG/M2 | HEIGHT: 68 IN | WEIGHT: 185 LBS

## 2022-09-26 DIAGNOSIS — M17.11 PRIMARY OSTEOARTHRITIS OF RIGHT KNEE: Primary | ICD-10-CM

## 2022-09-26 PROCEDURE — 20610 DRAIN/INJ JOINT/BURSA W/O US: CPT | Performed by: ORTHOPAEDIC SURGERY

## 2022-09-26 RX ORDER — BUPIVACAINE HYDROCHLORIDE 7.5 MG/ML
5 INJECTION, SOLUTION EPIDURAL; RETROBULBAR ONCE
Status: COMPLETED | OUTPATIENT
Start: 2022-09-26 | End: 2022-09-26

## 2022-09-26 RX ORDER — BUPIVACAINE HYDROCHLORIDE 7.5 MG/ML
5 INJECTION, SOLUTION EPIDURAL; RETROBULBAR ONCE
Status: DISCONTINUED | OUTPATIENT
Start: 2022-09-26 | End: 2022-09-26

## 2022-09-26 RX ORDER — TRIAMCINOLONE ACETONIDE 40 MG/ML
40 INJECTION, SUSPENSION INTRA-ARTICULAR; INTRAMUSCULAR ONCE
Status: COMPLETED | OUTPATIENT
Start: 2022-09-26 | End: 2022-09-26

## 2022-09-26 RX ORDER — TRIAMCINOLONE ACETONIDE 40 MG/ML
40 INJECTION, SUSPENSION INTRA-ARTICULAR; INTRAMUSCULAR ONCE
Status: DISCONTINUED | OUTPATIENT
Start: 2022-09-26 | End: 2022-09-26

## 2022-09-26 RX ADMIN — BUPIVACAINE HYDROCHLORIDE 37.5 MG: 7.5 INJECTION, SOLUTION EPIDURAL; RETROBULBAR at 22:17

## 2022-09-26 RX ADMIN — TRIAMCINOLONE ACETONIDE 40 MG: 40 INJECTION, SUSPENSION INTRA-ARTICULAR; INTRAMUSCULAR at 22:18

## 2022-09-26 NOTE — LETTER
9/26/2022    Patient: Luz Aguirre   YOB: 1980   Date of Visit: 9/26/2022     Jeris Holstein, MD  [address] [facility] [address]  Via Fax: [phone]    Dear Jeris Holstein, MD,      Thank you for referring Ms. Harper Cramer to [facility] for evaluation. My notes for this consultation are attached. If you have questions, please do not hesitate to call me. I look forward to following your patient along with you.       Sincerely,    Sabas Potter MD

## 2022-09-27 NOTE — PROGRESS NOTES
Urszula Uribe (: 1980) is a 40 y.o. female, patient, here for evaluation of the following chief complaint(s):  Knee Pain (Right )       SUBJECTIVE/OBJECTIVE:  Urszula Uribe presents today for evaluation of her right knee. Complex history. She has had 2 different cartilage restoration procedures in the medial compartment of her right knee. Last one was about 3 years ago, with osteochondral allograft to the medial femoral condyle. Initially she did okay. She has had progressive medial and anteromedial knee pain. She now has aching at rest.  Needs frequent position changes. She is now having wakening night pain. Has pain at start up and with all weightbearing activities. All of her symptoms started after a fall earlier this summer. Oral steroids x2 courses did not help. Cannot take nonsteroidal anti-inflammatories due to history of gastric bypass. She has not had a corticosteroid injection. Of note, recently underwent right knee MRI as ordered by Dr. Timbo Morales. Cartilaginous tumor was identified in the right distal femoral metaphysis. [facility] radiology thought there were some features concerning for possible low-grade chondrosarcoma rather than enchondroma. She was evaluated by orthopedic oncology at [facility], and they had no concerns for chondrosarcoma. The lesion is apparently not changed over the course of several years. PHYSICAL EXAM:  Vitals: Ht 5' 8\" (1.727 m)   Wt 185 lb (83.9 kg)   BMI 28.13 kg/m²   Body mass index is 28.13 kg/m². 40y.o. year old F, no distress. Ambulates with a limp on the right side. Pain-free motion right hip. Negative Stinchfield. Right knee is in neutral alignment. Healed scars from previous arthroscopies and osteochondral allograft. No knee effusion. Tender over medial joint line. Full extension with flexion to approximately 125 degrees. Patella tracks centrally. No instability. Equivocal India's. Symmetrical palpable distal pulses.   No gross motor or sensory deficits in lower extremities. No distal edema. IMAGING:  Radiographs: XR Results (most recent):  Results from Appointment encounter on 09/26/22    XR KNEE RT MIN 4 V    Narrative  4 x-ray views of the right knee including AP and PA flexion, lateral, sunrise demonstrate mild narrowing of medial compartment joint space. Irregularity of the distal weightbearing surface of the medial femoral condyle with central sclerosis, consistent with previous osteochondral allograft. Subchondral cystic changes present. Stippled calcification present in the distal femoral diaphysis, consistent with enchondroma. Recent right knee MRI was reviewed. Focal high-grade cartilage loss over the distal weightbearing surface of the medial femoral condyle. Subchondral edema present. No meniscal pathology. 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 ASSESSMENT/PLAN:  1. Primary osteoarthritis of right knee  -     XR KNEE RT MIN 4 V; Future  -     triamcinolone acetonide (KENALOG-40) 40 mg/mL injection 40 mg; 40 mg, Intra artICUlar, ONCE, 1 dose, On Mon 9/26/22 at 2300  -     bupivacaine (PF) (MARCAINE) 0.75 % (7.5 mg/mL) injection 37.5 mg; 37.5 mg (5 mL), Intra artICUlar, ONCE, 1 dose, On Mon 9/26/22 at 2300    Moderate medial compartment osteoarthritis of the right knee. Discussed continued conservative treatment measures for now, with possible medial unicompartmental knee replacement at some point in the future. Due to her young age and preserved joint space on plain radiographs, I have recommended that we put off any type of replacement surgery as long as possible. She cannot take anti-inflammatories. To temporize her symptoms I have recommended intra-articular corticosteroid injection. She desires to proceed.   Discussed risks and benefits, verbal consent obtained for intra-articular corticosteroid injection in the right knee.    After sterile prep of the right knee, 40 mg of Kenalog and 5 cc of 0.75% Marcaine were injected into right knee under sterile conditions. Tolerated procedure well. Continue quad strengthening program.    According to orthopedic oncology at [facility], no formal long-term follow-up is required for the cartilaginous tumor in the right distal femoral metaphysis. She will return to see me as needed. No follow-ups on file. Review Of Systems  ROS    Positive for: Musculoskeletal  Last edited by Connor Macias on 9/26/2022  2:15 PM.         Patient denies any recent fever, chills, nausea, vomiting, chest pain, or shortness of breath. Allergies   Allergen Reactions    Allegra [Fexofenadine] Shortness of Breath    Amoxicillin Other (comments)     Yeast infection    Benadryl [Diphenhydramine Hcl] Shortness of Breath    Ibuprofen Rash       Current Outpatient Medications   Medication Sig    omeprazole (PRILOSEC) 20 mg capsule Take 20 mg by mouth every morning.    gabapentin (NEURONTIN) 300 mg capsule Take 600 mg by mouth two (2) times a day. MORNING AND NIGHT    metoprolol tartrate (LOPRESSOR) 50 mg tablet Take 50 mg by mouth every morning. venlafaxine-SR (EFFEXOR-XR) 75 mg capsule Take 75 mg by mouth every morning. levothyroxine (SYNTHROID) 100 mcg tablet Take  by mouth Daily (before breakfast). methylPREDNISolone (MEDROL DOSEPACK) 4 mg tablet Per dose pack instructions (Patient not taking: No sig reported)    diclofenac EC (VOLTAREN) 75 mg EC tablet Take 1 Tablet by mouth two (2) times a day. methylPREDNISolone (Medrol, Mark,) 4 mg tablet Use as directed (Patient not taking: No sig reported)    DULoxetine (CYMBALTA) 30 mg capsule Take 30 mg by mouth daily. montelukast (SINGULAIR) 10 mg tablet Take 10 mg by mouth daily. loratadine (CLARITIN) 10 mg tablet Take 10 mg by mouth. MULTIVITAMIN PO Take 1 Tab by mouth daily.      No current facility-administered medications for this visit.        Past Medical History:   Diagnosis Date    Arrhythmia 2019    palpitations    Arthritis     Burning with urination     Chronic pain     BACK PAIN LOWER    GERD (gastroesophageal reflux disease)     History of esophagogastroduodenoscopy (EGD) x2    Hypertension     Hypothyroidism     Motion sickness     Nausea & vomiting     Psychiatric disorder     anxiety    Thromboembolus ([address] 2014    right leg DVT FOLLOWING KNEE SURGERY    Thyroid disease     hypothryorid        Past Surgical History:   Procedure Laterality Date    HX  SECTION  2004    HX  SECTION  2012    HX CHOLECYSTECTOMY  2010    HX GASTRIC BYPASS  2016    HX KNEE ARTHROSCOPY Right     HX KNEE ARTHROSCOPY Right     HX KNEE ARTHROSCOPY Right     HX KNEE ARTHROSCOPY Right     HX ORTHOPAEDIC      hip surgery     HX OTHER SURGICAL  2018    body lift    HX UROLOGICAL  2020    Cystoscopy    HX UROLOGICAL  2020     retrograde pyelograms    HX UROLOGICAL  2020    bladder biopsies       Family History   Problem Relation Age of Onset    Hypertension Mother     Thyroid Disease Mother     Hypertension Father     Heart Disease Father     Lung Disease Father         COPD    Anesth Problems Father         STOPPED BREATHING    No Known Problems Sister     Heart Disease Maternal Grandmother     Cancer Maternal Grandmother         kidney cancer        Social History     Socioeconomic History    Marital status:      Spouse name: Not on file    Number of children: Not on file    Years of education: Not on file    Highest education level: Not on file   Occupational History    Not on file   Tobacco Use    Smoking status: Former     Packs/day: 3.00     Years: 27.00     Pack years: 81.00     Types: Cigarettes     Quit date: 2019     Years since quitting: 3.0    Smokeless tobacco: Never   Vaping Use    Vaping Use: Never used   Substance and Sexual Activity    Alcohol use: Not Currently    Drug use: Never    Sexual activity: Yes     Partners: Male   Other Topics Concern    Not on file   Social History Narrative    Not on file     Social Determinants of Health     Financial Resource Strain: Not on file   Food Insecurity: Not on file   Transportation Needs: Not on file   Physical Activity: Not on file   Stress: Not on file   Social Connections: Not on file   Intimate Partner Violence: Not on file   Housing Stability: Not on file       Orders Placed This Encounter    XR KNEE RT MIN 4 V     Standing Status:   Future     Number of Occurrences:   1     Standing Expiration Date:   9/27/2023     Order Specific Question:   Is Patient Pregnant? Answer:   No    DISCONTD: triamcinolone acetonide (KENALOG-40) 40 mg/mL injection 40 mg    DISCONTD: bupivacaine (PF) (MARCAINE) 0.75 % (7.5 mg/mL) injection 37.5 mg    triamcinolone acetonide (KENALOG-40) 40 mg/mL injection 40 mg    bupivacaine (PF) (MARCAINE) 0.75 % (7.5 mg/mL) injection 37.5 mg        An electronic signature was used to authenticate this note.   -- Gautam Glass MD

## 2022-09-29 NOTE — LETTER
NOTIFICATION RETURN TO WORK / SCHOOL    9/29/2022 11:39 AM    Ms. [place] [facility] [id number]      To Whom It May Concern:    Geri Vera is currently under the care of [facility]. She will return to work/school on: 10/10/2022 allow breaks as needed, and avoid     Repetitive heavy lifting until further notice. If there are questions or concerns please have the patient contact our office.         Sincerely,      Cecelia Lopez, DO

## 2022-10-17 ENCOUNTER — OFFICE VISIT (OUTPATIENT)
Dept: ORTHOPEDIC SURGERY | Age: 42
End: 2022-10-17
Payer: OTHER MISCELLANEOUS

## 2022-10-17 VITALS — BODY MASS INDEX: 28.79 KG/M2 | HEIGHT: 68 IN | WEIGHT: 190 LBS

## 2022-10-17 DIAGNOSIS — M17.11 PRIMARY OSTEOARTHRITIS OF RIGHT KNEE: Primary | ICD-10-CM

## 2022-10-17 PROCEDURE — 99213 OFFICE O/P EST LOW 20 MIN: CPT | Performed by: ORTHOPAEDIC SURGERY

## 2022-10-17 NOTE — PROGRESS NOTES
Terrance Hinds (: 1980) is a 40 y.o. female, patient, here for evaluation of the following chief complaint(s):  Knee Pain (Right /)       SUBJECTIVE/OBJECTIVE:  Terrance Hinds presents today complaining of continued right knee pain. I gave her a corticosteroid injection at her last visit, which only helped for a few days. Symptoms have returned. The current pain is predominantly medial, but she has been having constant aching anterior knee pain as well. States she cannot afford PT, doing HEP with stationary bike. Sounds like an  brace was ordered and recently received in the mail, she's waiting for proper fitting. States topical Voltaren doesn't help. To review, Complex history. She has had several different cartilage restoration procedures in the medial compartment of her right knee. Last one was about 3-4 years ago, with osteochondral allograft to the medial femoral condyle, by Dr. Estela Phillip. Initially she did okay. She has had progressive medial and anteromedial knee pain. She now has aching at rest.  Needs frequent position changes. She is now having wakening night pain. Has pain at start up and with all weightbearing activities. All of her current symptoms started after a fall earlier this summer. Oral steroids x2 courses did not help. Cannot take nonsteroidal anti-inflammatories due to history of gastric bypass. Of note, recently underwent right knee MRI as ordered by Dr. Hayes Bhatt. Cartilaginous tumor was identified in the right distal femoral metaphysis. [facility] radiology thought there were some features concerning for possible low-grade chondrosarcoma rather than enchondroma. She was evaluated by orthopedic oncology at [facility], and they had no concerns for chondrosarcoma. The lesion is apparently not changed over the course of several years.         PHYSICAL EXAM:  Vitals: Ht 5' 8\" (1.727 m)   Wt 190 lb (86.2 kg)   BMI 28.89 kg/m²   Body mass index is 28.89 kg/m².    40y.o. year old F in no acute distress. Ambulates with a limp on the right side. Right knee is in neutral alignment. Full extension with flexion to approximately 125 degrees. Patella tracks centrally. No instability. Equivocal [place]'s. No distal edema. IMAGING:  Radiographs: Recent xrays demonstrate mild narrowing medial compartment joint space with irregularity of the distal WB surface of the [address] with central sclerosis, consisted with previous osteochondral allograft. Subchondral cystic changes present. Recent right knee MRI was reviewed. Focal high-grade cartilage loss over the distal weightbearing surface of the medial femoral condyle. Subchondral edema present. No meniscal pathology. 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 ASSESSMENT/PLAN:  1. Primary osteoarthritis of right knee    The xray and exam findings were discussed with the patient today. Moderate medial compartment osteoarthritis with worsening symptoms despite conservative treatment measures outlined above. Pain is no longer isolated to the medial side of the knee. I do not feel she is a candidate for partial knee replacement at this time, and certainly not of total knee replacement. I have recommended that she start using the  brace that she recently received. I encouraged her to remain as active as possible. She is going to be dealing with a chronic pain issue before she is a candidate for any type of joint replacement procedure. No follow-ups on file. Review Of Systems     Patient denies any recent fever, chills, nausea, vomiting, chest pain, or shortness of breath.     Allergies   Allergen Reactions    Allegra [Fexofenadine] Shortness of Breath    Amoxicillin Other (comments)     Yeast infection    Benadryl [Diphenhydramine Hcl] Shortness of Breath    Ibuprofen Rash       Current Outpatient Medications Medication Sig    diclofenac EC (VOLTAREN) 75 mg EC tablet Take 1 Tablet by mouth two (2) times a day. DULoxetine (CYMBALTA) 30 mg capsule Take 30 mg by mouth daily. montelukast (SINGULAIR) 10 mg tablet Take 10 mg by mouth daily. loratadine (CLARITIN) 10 mg tablet Take 10 mg by mouth. omeprazole (PRILOSEC) 20 mg capsule Take 20 mg by mouth every morning.    gabapentin (NEURONTIN) 300 mg capsule Take 600 mg by mouth two (2) times a day. MORNING AND NIGHT    metoprolol tartrate (LOPRESSOR) 50 mg tablet Take 50 mg by mouth every morning. MULTIVITAMIN PO Take 1 Tab by mouth daily. venlafaxine-SR (EFFEXOR-XR) 75 mg capsule Take 75 mg by mouth every morning. levothyroxine (SYNTHROID) 100 mcg tablet Take  by mouth Daily (before breakfast). methylPREDNISolone (MEDROL DOSEPACK) 4 mg tablet Per dose pack instructions (Patient not taking: No sig reported)    methylPREDNISolone (Medrol, Mark,) 4 mg tablet Use as directed (Patient not taking: No sig reported)     No current facility-administered medications for this visit.        Past Medical History:   Diagnosis Date    Arrhythmia     palpitations    Arthritis     Burning with urination     Chronic pain     BACK PAIN LOWER    GERD (gastroesophageal reflux disease)     History of esophagogastroduodenoscopy (EGD) x2    Hypertension     Hypothyroidism     Motion sickness     Nausea & vomiting     Psychiatric disorder     anxiety    Thromboembolus ([address] 2014    right leg DVT FOLLOWING KNEE SURGERY    Thyroid disease     hypothryorid        Past Surgical History:   Procedure Laterality Date    HX  SECTION      HX  SECTION      HX CHOLECYSTECTOMY      HX GASTRIC BYPASS  2016    HX KNEE ARTHROSCOPY Right     HX KNEE ARTHROSCOPY Right     HX KNEE ARTHROSCOPY Right     HX KNEE ARTHROSCOPY Right     HX ORTHOPAEDIC      hip surgery     HX OTHER SURGICAL  2018    body lift    HX UROLOGICAL  2020    Cystoscopy    HX UROLOGICAL  12/21/2020     retrograde pyelograms    HX UROLOGICAL  12/21/2020    bladder biopsies       Family History   Problem Relation Age of Onset    Hypertension Mother     Thyroid Disease Mother     Hypertension Father     Heart Disease Father     Lung Disease Father         COPD    Anesth Problems Father         STOPPED BREATHING    No Known Problems Sister     Heart Disease Maternal Grandmother     Cancer Maternal Grandmother         kidney cancer        Social History     Socioeconomic History    Marital status:      Spouse name: Not on file    Number of children: Not on file    Years of education: Not on file    Highest education level: Not on file   Occupational History    Not on file   Tobacco Use    Smoking status: Former     Packs/day: 3.00     Years: 27.00     Pack years: 81.00     Types: Cigarettes     Quit date: 9/12/2019     Years since quitting: 3.0    Smokeless tobacco: Never   Vaping Use    Vaping Use: Never used   Substance and Sexual Activity    Alcohol use: Not Currently    Drug use: Never    Sexual activity: Yes     Partners: Male   Other Topics Concern    Not on file   Social History Narrative    Not on file     Social Determinants of Health     Financial Resource Strain: Not on file   Food Insecurity: Not on file   Transportation Needs: Not on file   Physical Activity: Not on file   Stress: Not on file   Social Connections: Not on file   Intimate Partner Violence: Not on file   Housing Stability: Not on file       No orders of the defined types were placed in this encounter. Danny Lorenzo. Hilda Ormond, M.D. An electronic signature was used to authenticate this note.

## 2022-11-03 ENCOUNTER — TELEPHONE (OUTPATIENT)
Dept: UROLOGY | Age: 42
End: 2022-11-03

## 2022-11-03 NOTE — TELEPHONE ENCOUNTER
I have tried to reach patient to see if she has another insurance, coming back ineligible through [employer] and through the [facility] License [employer]quis[employer] portal. If patient is active with  [employer] under spousal coverage she will need a referral or have to sign a waiver stating she is self pay if applicable or if she does not have a referral at the time of her visit

## 2023-02-01 ENCOUNTER — OFFICE VISIT (OUTPATIENT)
Dept: ORTHOPEDIC SURGERY | Age: 43
End: 2023-02-01
Payer: OTHER GOVERNMENT

## 2023-02-01 VITALS — HEIGHT: 68 IN | BODY MASS INDEX: 28.79 KG/M2 | WEIGHT: 190 LBS

## 2023-02-01 DIAGNOSIS — M25.552 LEFT HIP PAIN: Primary | ICD-10-CM

## 2023-02-01 NOTE — LETTER
2/1/2023    Patient: Angel Shoemaker   YOB: 1980   Date of Visit: 2/1/2023     Livia Meigs, MD  [address] [facility] [address]  Via Fax: [phone]    Dear Livia Meigs, MD,      Thank you for referring Ms. Katerina Pimentel to [facility] for evaluation. My notes for this consultation are attached. If you have questions, please do not hesitate to call me. I look forward to following your patient along with you.       Sincerely,    Dilia Willson MD

## 2023-02-01 NOTE — PROGRESS NOTES
Denise Narvaez (: 1980) is a 40 y.o. female, patient, here for evaluation of the following chief complaint(s):  Hip Pain (Left hip pain - started 1-2 yrs ago progressively worsen in the last four months /No injections or physical therapy/No injury or fall reported )       HPI:    Low back pain is chief complaint. Patient's pain is in her buttock and lateral hip. Does not have much groin pain. Prior hip arthroscopy. Allergies   Allergen Reactions    Allegra [Fexofenadine] Shortness of Breath    Amoxicillin Other (comments)     Yeast infection    Benadryl [Diphenhydramine Hcl] Shortness of Breath    Ibuprofen Rash       Current Outpatient Medications   Medication Sig    methylPREDNISolone (MEDROL DOSEPACK) 4 mg tablet Per dose pack instructions (Patient not taking: No sig reported)    diclofenac EC (VOLTAREN) 75 mg EC tablet Take 1 Tablet by mouth two (2) times a day. methylPREDNISolone (Medrol, Mark,) 4 mg tablet Use as directed (Patient not taking: No sig reported)    DULoxetine (CYMBALTA) 30 mg capsule Take 30 mg by mouth daily. montelukast (SINGULAIR) 10 mg tablet Take 10 mg by mouth daily. loratadine (CLARITIN) 10 mg tablet Take 10 mg by mouth. omeprazole (PRILOSEC) 20 mg capsule Take 20 mg by mouth every morning.    gabapentin (NEURONTIN) 300 mg capsule Take 600 mg by mouth two (2) times a day. MORNING AND NIGHT    metoprolol tartrate (LOPRESSOR) 50 mg tablet Take 50 mg by mouth every morning. MULTIVITAMIN PO Take 1 Tab by mouth daily. venlafaxine-SR (EFFEXOR-XR) 75 mg capsule Take 75 mg by mouth every morning. levothyroxine (SYNTHROID) 100 mcg tablet Take  by mouth Daily (before breakfast). No current facility-administered medications for this visit.        Past Medical History:   Diagnosis Date    Arrhythmia     palpitations    Arthritis     Burning with urination     Chronic pain     BACK PAIN LOWER    GERD (gastroesophageal reflux disease)     History of esophagogastroduodenoscopy (EGD) x2    Hypertension     Hypothyroidism     Motion sickness     Nausea & vomiting     Psychiatric disorder     anxiety    Thromboembolus ([address] 2014    right leg DVT FOLLOWING KNEE SURGERY    Thyroid disease     hypothryorid        Past Surgical History:   Procedure Laterality Date    HX  SECTION  2004    HX  SECTION  2012    HX CHOLECYSTECTOMY  2010    HX GASTRIC BYPASS  2016    HX KNEE ARTHROSCOPY Right     HX KNEE ARTHROSCOPY Right     HX KNEE ARTHROSCOPY Right     HX KNEE ARTHROSCOPY Right     HX ORTHOPAEDIC      hip surgery     HX OTHER SURGICAL  2018    body lift    HX UROLOGICAL  2020    Cystoscopy    HX UROLOGICAL  2020     retrograde pyelograms    HX UROLOGICAL  2020    bladder biopsies       Family History   Problem Relation Age of Onset    Hypertension Mother     Thyroid Disease Mother     Hypertension Father     Heart Disease Father     Lung Disease Father         COPD    Anesth Problems Father         STOPPED BREATHING    No Known Problems Sister     Heart Disease Maternal Grandmother     Cancer Maternal Grandmother         kidney cancer        Social History     Socioeconomic History    Marital status:      Spouse name: Not on file    Number of children: Not on file    Years of education: Not on file    Highest education level: Not on file   Occupational History    Not on file   Tobacco Use    Smoking status: Former     Packs/day: 3.00     Years: 27.00     Pack years: 81.00     Types: Cigarettes     Quit date: 2019     Years since quitting: 3.3    Smokeless tobacco: Never   Vaping Use    Vaping Use: Never used   Substance and Sexual Activity    Alcohol use: Not Currently    Drug use: Never    Sexual activity: Yes     Partners: Male   Other Topics Concern    Not on file   Social History Narrative    Not on file     Social Determinants of Health     Financial Resource Strain: Not on file   Food Insecurity: Not on file Transportation Needs: Not on file   Physical Activity: Not on file   Stress: Not on file   Social Connections: Not on file   Intimate Partner Violence: Not on file   Housing Stability: Not on file       ROS    Positive for: Musculoskeletal  Last edited by Marion Ochoa on 2/1/2023  3:46 PM.            Vitals:  Ht 5' 8\" (1.727 m)   Wt 190 lb (86.2 kg)   BMI 28.89 kg/m²    Body mass index is 28.89 kg/m². PHYSICAL EXAM:  On exam of her hip today patient has full extension flexion 90 degrees. Hip is painless to logroll and impingement testing. Mild trochanteric tenderness present. IMAGING:  XR Results (most recent):  Results from Appointment encounter on 02/01/23    XR HIP LT W OR WO PELV 2-3 VWS    Narrative  2 x-ray views left hip. Subchondral sclerosis is noted in the acetabulum with a paralabral cyst.  Tonus stage I.  Joint space remains relatively well-maintained. ASSESSMENT/PLAN:  1. Left hip pain  -     XR HIP LT W OR WO PELV 2-3 VWS; Future    We are going to try an intra-articular injection into her left hip. Does have some mild changes. Not indicated for any further surgeries. Would not recommend MRI or other imaging studies as this will just confuse the clinical picture. She is not a candidate at this stage for hip replacement surgery which is the next surgery that would be beneficial for her left hip. An electronic signature was used to authenticate this note.   --Sharyn Rosales MD

## 2023-02-14 ENCOUNTER — HOSPITAL ENCOUNTER (OUTPATIENT)
Dept: GENERAL RADIOLOGY | Age: 43
Discharge: HOME | End: 2023-02-14
Payer: OTHER GOVERNMENT

## 2023-02-14 DIAGNOSIS — M25.552 LEFT HIP PAIN: ICD-10-CM

## 2023-02-14 PROCEDURE — 74011000636 HC RX REV CODE- 636: Performed by: ORTHOPAEDIC SURGERY

## 2023-02-14 PROCEDURE — 20610 DRAIN/INJ JOINT/BURSA W/O US: CPT

## 2023-02-14 RX ORDER — TRIAMCINOLONE ACETONIDE 40 MG/ML
40 INJECTION, SUSPENSION INTRA-ARTICULAR; INTRAMUSCULAR ONCE
Status: DISPENSED | OUTPATIENT
Start: 2023-02-14 | End: 2023-02-14

## 2023-02-14 RX ORDER — LIDOCAINE HYDROCHLORIDE 10 MG/ML
10 INJECTION INFILTRATION; PERINEURAL
Status: DISCONTINUED | OUTPATIENT
Start: 2023-02-14 | End: 2023-02-15 | Stop reason: HOSPADM

## 2023-02-14 RX ORDER — BUPIVACAINE HYDROCHLORIDE 5 MG/ML
1 INJECTION, SOLUTION EPIDURAL; INTRACAUDAL
Status: DISPENSED | OUTPATIENT
Start: 2023-02-14 | End: 2023-02-14

## 2023-02-14 RX ADMIN — IOPAMIDOL 10 ML: 408 INJECTION, SOLUTION INTRATHECAL at 13:00

## 2023-03-10 ENCOUNTER — TELEPHONE (OUTPATIENT)
Dept: UROLOGY | Age: 43
End: 2023-03-10

## 2023-03-10 NOTE — TELEPHONE ENCOUNTER
Patient called to make follow up for ct she had at [facility]- she states they told her, that her kidney was red and abnormal. Explained to patient there's not really a thing such as a red kidney and that maybe the context was mixed up with the results being read and reflected abnormalities . Patient is trying to login in to [facility] portal to see what their comments or what was shown on the ct scan. Patient will call me back with more info.

## 2023-05-29 RX ORDER — DICLOFENAC SODIUM 75 MG/1
75 TABLET, DELAYED RELEASE ORAL 2 TIMES DAILY
COMMUNITY
Start: 2022-08-29

## 2023-05-29 RX ORDER — OMEPRAZOLE 20 MG/1
20 CAPSULE, DELAYED RELEASE ORAL
COMMUNITY

## 2023-05-29 RX ORDER — METHYLPREDNISOLONE 4 MG/1
TABLET ORAL
COMMUNITY
Start: 2022-09-07

## 2023-05-29 RX ORDER — GABAPENTIN 300 MG/1
600 CAPSULE ORAL 2 TIMES DAILY
COMMUNITY

## 2023-05-29 RX ORDER — VENLAFAXINE HYDROCHLORIDE 75 MG/1
75 CAPSULE, EXTENDED RELEASE ORAL
COMMUNITY

## 2023-05-29 RX ORDER — LEVOTHYROXINE SODIUM 0.1 MG/1
TABLET ORAL
COMMUNITY

## 2023-05-29 RX ORDER — MONTELUKAST SODIUM 10 MG/1
10 TABLET ORAL DAILY
COMMUNITY

## 2023-05-29 RX ORDER — LORATADINE 10 MG/1
10 TABLET ORAL
COMMUNITY

## 2023-05-29 RX ORDER — DULOXETIN HYDROCHLORIDE 30 MG/1
30 CAPSULE, DELAYED RELEASE ORAL DAILY
COMMUNITY
Start: 2021-07-19

## 2023-05-29 RX ORDER — METOPROLOL TARTRATE 50 MG/1
50 TABLET, FILM COATED ORAL
COMMUNITY

## 2023-05-30 RX ORDER — METHYLPREDNISOLONE 4 MG/1
TABLET ORAL
COMMUNITY
Start: 2022-08-12

## 2023-11-14 ENCOUNTER — TELEPHONE (OUTPATIENT)
Age: 43
End: 2023-11-14

## 2023-11-14 NOTE — TELEPHONE ENCOUNTER
Patient called regarding possible revision/ hernia. Patient had bypass in 2016 in [place]. Sent seminar via e-mail.

## 2024-06-03 PROBLEM — G89.29 CHRONIC RIGHT-SIDED LOW BACK PAIN WITHOUT SCIATICA: Status: RESOLVED | Noted: 2020-12-09 | Resolved: 2024-06-03

## 2024-06-03 PROBLEM — M54.50 CHRONIC RIGHT-SIDED LOW BACK PAIN WITHOUT SCIATICA: Status: RESOLVED | Noted: 2020-12-09 | Resolved: 2024-06-03

## 2024-06-03 PROBLEM — Z87.448 HISTORY OF HEMATURIA: Status: ACTIVE | Noted: 2020-12-09

## 2024-06-05 ENCOUNTER — OFFICE VISIT (OUTPATIENT)
Age: 44
End: 2024-06-05
Payer: OTHER GOVERNMENT

## 2024-06-05 VITALS
WEIGHT: 180 LBS | SYSTOLIC BLOOD PRESSURE: 125 MMHG | DIASTOLIC BLOOD PRESSURE: 79 MMHG | HEIGHT: 68 IN | BODY MASS INDEX: 27.28 KG/M2 | HEART RATE: 82 BPM

## 2024-06-05 DIAGNOSIS — N28.1 RENAL CYST: ICD-10-CM

## 2024-06-05 DIAGNOSIS — R33.9 INCOMPLETE EMPTYING OF BLADDER: Primary | ICD-10-CM

## 2024-06-05 DIAGNOSIS — R39.15 URGENCY OF MICTURITION: ICD-10-CM

## 2024-06-05 DIAGNOSIS — N30.10 CYSTITIS, INTERSTITIAL: ICD-10-CM

## 2024-06-05 DIAGNOSIS — Z87.448 HISTORY OF HEMATURIA: ICD-10-CM

## 2024-06-05 LAB
BILIRUBIN, URINE, POC: NEGATIVE
BLOOD URINE, POC: NORMAL
GLUCOSE URINE, POC: NEGATIVE
KETONES, URINE, POC: NEGATIVE
LEUKOCYTE ESTERASE, URINE, POC: NEGATIVE
NITRITE, URINE, POC: NEGATIVE
PH, URINE, POC: 5.5 (ref 4.6–8)
PROTEIN,URINE, POC: NEGATIVE
SPECIFIC GRAVITY, URINE, POC: 1.03 (ref 1–1.03)
URINALYSIS CLARITY, POC: CLEAR
URINALYSIS COLOR, POC: YELLOW
UROBILINOGEN, POC: NORMAL

## 2024-06-05 PROCEDURE — 99214 OFFICE O/P EST MOD 30 MIN: CPT | Performed by: UROLOGY

## 2024-06-05 PROCEDURE — 81003 URINALYSIS AUTO W/O SCOPE: CPT | Performed by: UROLOGY

## 2024-06-05 RX ORDER — METOPROLOL SUCCINATE 50 MG/1
TABLET, EXTENDED RELEASE ORAL
COMMUNITY
Start: 2024-03-27

## 2024-06-05 RX ORDER — LAMOTRIGINE 25 MG/1
TABLET ORAL
COMMUNITY
Start: 2024-05-16

## 2024-06-05 RX ORDER — SUMATRIPTAN 50 MG/1
50 TABLET, FILM COATED ORAL
COMMUNITY
Start: 2024-01-08

## 2024-06-05 RX ORDER — BACLOFEN 10 MG/1
TABLET ORAL
COMMUNITY
Start: 2023-11-15

## 2024-06-05 NOTE — ASSESSMENT & PLAN NOTE
Frequency and sensation of incomplete emptying.  She is s/p hysterectomy.  I recommend evaluation with cystoscopy/ uroflow/ CMG.  I asked her to do a 48-hour voiding diary.  She expressed understanding of the plan.

## 2024-06-05 NOTE — PROGRESS NOTES
Chief Complaint   Patient presents with    Follow-up    Urinary Frequency     1. Have you been to the ER, urgent care clinic since your last visit?  Hospitalized since your last visit?No    2. Have you seen or consulted any other health care providers outside of the [facility] since your last visit?  Include any pap smears or colon screening. No  /79 (Site: Right Upper Arm, Position: Sitting, Cuff Size: Medium Adult)   Pulse 82   Ht 1.727 m (5' 8\")   Wt 81.6 kg (180 lb)   BMI 27.37 kg/m²

## 2024-06-05 NOTE — PROGRESS NOTES
HISTORY OF PRESENT ILLNESS  Sommer Betancourt is a 43 y.o. female.   has a past medical history of Arrhythmia, Arthritis, Burning with urination, Chronic pain, GERD (gastroesophageal reflux disease), History of esophagogastroduodenoscopy (EGD), Hypertension, Hypothyroidism, Motion sickness, Nausea & vomiting, Psychiatric disorder, Thromboembolus (HCC), and Thyroid disease.  has a past surgical history that includes Knee arthroscopy (Right); Knee arthroscopy (Right); Knee arthroscopy (Right); Knee arthroscopy (Right);  section ();  section (2004); other surgical history (2018); Gastric bypass surgery (2016); Cholecystectomy (); Urological Surgery (2020); Urological Surgery (2020); orthopedic surgery; and Urological Surgery (2020).  Chief Complaint   Patient presents with    Follow-up    Urinary Frequency     She has had recurrent UTIs. She had 5 over the past year.      She feels like she has incomplete emptying.  She has double voiding within minutes and sometimes a large amount the second time.  It can also be a small amount the second time.  She has frequency today.   She drinks 24 -48 oz and also electrolyte water with liquid IV supplements.      She voids 12x per day.  Bladder scan residual was 0 today.    She has a h/o IC. She was on oxybutynin in the past.  She is not     She had HH surgery in Dec.  She had a hysterectomy 3/2024 without BSO.  She feels her urination has changed since her hysterectomy.  She has stress with work and family.      Urinary Frequency   Associated symptoms include frequency.       1. Incomplete emptying of bladder  Assessment & Plan:   Frequency and sensation of incomplete emptying.  She is s/p hysterectomy.  I recommend evaluation with cystoscopy/ uroflow/ CMG.  I asked her to do a 48-hour voiding diary.  She expressed understanding of the plan.  Orders:  -     AMB POC PVR, HALLIE,POST-VOID RES,US,NON-IMAGING  2. Cystitis,

## 2024-06-06 LAB
APPEARANCE UR: CLEAR
BACTERIA #/AREA URNS HPF: ABNORMAL /[HPF]
BILIRUB UR QL STRIP: NEGATIVE
CASTS URNS QL MICRO: ABNORMAL /LPF
COLOR UR: YELLOW
CRYSTALS URNS MICRO: ABNORMAL
EPI CELLS #/AREA URNS HPF: ABNORMAL /HPF (ref 0–10)
GLUCOSE UR QL STRIP: NEGATIVE
HGB UR QL STRIP: NEGATIVE
KETONES UR QL STRIP: NEGATIVE
LEUKOCYTE ESTERASE UR QL STRIP: NEGATIVE
MICRO URNS: NORMAL
MICRO URNS: NORMAL
NITRITE UR QL STRIP: NEGATIVE
PH UR STRIP: 5.5 [PH] (ref 5–7.5)
PROT UR QL STRIP: NEGATIVE
RBC #/AREA URNS HPF: ABNORMAL /HPF (ref 0–2)
SP GR UR STRIP: 1.02 (ref 1–1.03)
UNIDENT CRYS URNS QL MICRO: PRESENT
UROBILINOGEN UR STRIP-MCNC: 0.2 MG/DL (ref 0.2–1)
WBC #/AREA URNS HPF: ABNORMAL /HPF (ref 0–5)

## 2024-09-11 ENCOUNTER — HOSPITAL ENCOUNTER (OUTPATIENT)
Facility: HOSPITAL | Age: 44
Setting detail: OUTPATIENT SURGERY
Discharge: HOME | End: 2024-09-11
Attending: INTERNAL MEDICINE | Admitting: INTERNAL MEDICINE
Payer: OTHER GOVERNMENT

## 2024-09-11 ENCOUNTER — ANESTHESIA (OUTPATIENT)
Facility: HOSPITAL | Age: 44
End: 2024-09-11
Payer: OTHER GOVERNMENT

## 2024-09-11 ENCOUNTER — ANESTHESIA EVENT (OUTPATIENT)
Facility: HOSPITAL | Age: 44
End: 2024-09-11
Payer: OTHER GOVERNMENT

## 2024-09-11 VITALS
HEART RATE: 71 BPM | WEIGHT: 179.9 LBS | HEIGHT: 68 IN | TEMPERATURE: 97.9 F | OXYGEN SATURATION: 100 % | SYSTOLIC BLOOD PRESSURE: 126 MMHG | BODY MASS INDEX: 27.26 KG/M2 | RESPIRATION RATE: 14 BRPM | DIASTOLIC BLOOD PRESSURE: 79 MMHG

## 2024-09-11 PROCEDURE — 3600007512: Performed by: INTERNAL MEDICINE

## 2024-09-11 PROCEDURE — 7100000011 HC PHASE II RECOVERY - ADDTL 15 MIN: Performed by: INTERNAL MEDICINE

## 2024-09-11 PROCEDURE — 7100000010 HC PHASE II RECOVERY - FIRST 15 MIN: Performed by: INTERNAL MEDICINE

## 2024-09-11 PROCEDURE — 3600007502: Performed by: INTERNAL MEDICINE

## 2024-09-11 PROCEDURE — 6360000002 HC RX W HCPCS: Performed by: NURSE ANESTHETIST, CERTIFIED REGISTERED

## 2024-09-11 PROCEDURE — 2500000003 HC RX 250 WO HCPCS: Performed by: NURSE ANESTHETIST, CERTIFIED REGISTERED

## 2024-09-11 PROCEDURE — 3700000001 HC ADD 15 MINUTES (ANESTHESIA): Performed by: INTERNAL MEDICINE

## 2024-09-11 PROCEDURE — 3700000000 HC ANESTHESIA ATTENDED CARE: Performed by: INTERNAL MEDICINE

## 2024-09-11 PROCEDURE — 88305 TISSUE EXAM BY PATHOLOGIST: CPT

## 2024-09-11 RX ORDER — SODIUM CHLORIDE 9 MG/ML
INJECTION, SOLUTION INTRAVENOUS PRN
Status: DISCONTINUED | OUTPATIENT
Start: 2024-09-11 | End: 2024-09-11 | Stop reason: HOSPADM

## 2024-09-11 RX ORDER — SODIUM CHLORIDE 0.9 % (FLUSH) 0.9 %
5-40 SYRINGE (ML) INJECTION EVERY 12 HOURS SCHEDULED
Status: DISCONTINUED | OUTPATIENT
Start: 2024-09-11 | End: 2024-09-11 | Stop reason: HOSPADM

## 2024-09-11 RX ORDER — SODIUM CHLORIDE 0.9 % (FLUSH) 0.9 %
5-40 SYRINGE (ML) INJECTION PRN
Status: DISCONTINUED | OUTPATIENT
Start: 2024-09-11 | End: 2024-09-11 | Stop reason: HOSPADM

## 2024-09-11 RX ORDER — PROPOFOL 10 MG/ML
INJECTION, EMULSION INTRAVENOUS CONTINUOUS PRN
Status: DISCONTINUED | OUTPATIENT
Start: 2024-09-11 | End: 2024-09-11 | Stop reason: SDUPTHER

## 2024-09-11 RX ORDER — SODIUM CHLORIDE 9 MG/ML
25 INJECTION, SOLUTION INTRAVENOUS PRN
Status: DISCONTINUED | OUTPATIENT
Start: 2024-09-11 | End: 2024-09-11 | Stop reason: HOSPADM

## 2024-09-11 RX ORDER — LIDOCAINE HYDROCHLORIDE 20 MG/ML
INJECTION, SOLUTION EPIDURAL; INFILTRATION; INTRACAUDAL; PERINEURAL PRN
Status: DISCONTINUED | OUTPATIENT
Start: 2024-09-11 | End: 2024-09-11 | Stop reason: SDUPTHER

## 2024-09-11 RX ORDER — VILAZODONE HYDROCHLORIDE 10 MG/1
10 TABLET ORAL DAILY
COMMUNITY

## 2024-09-11 RX ORDER — ONDANSETRON 4 MG/1
4 TABLET, ORALLY DISINTEGRATING ORAL EVERY 8 HOURS PRN
Status: DISCONTINUED | OUTPATIENT
Start: 2024-09-11 | End: 2024-09-11 | Stop reason: HOSPADM

## 2024-09-11 RX ORDER — ONDANSETRON 2 MG/ML
4 INJECTION INTRAMUSCULAR; INTRAVENOUS EVERY 6 HOURS PRN
Status: DISCONTINUED | OUTPATIENT
Start: 2024-09-11 | End: 2024-09-11 | Stop reason: HOSPADM

## 2024-09-11 RX ADMIN — LIDOCAINE HYDROCHLORIDE 100 MG: 20 INJECTION, SOLUTION EPIDURAL; INFILTRATION; INTRACAUDAL at 09:51

## 2024-09-11 RX ADMIN — PROPOFOL 500 MCG/KG/MIN: 10 INJECTION, EMULSION INTRAVENOUS at 09:49

## 2024-09-11 ASSESSMENT — PAIN SCALES - GENERAL
PAINLEVEL_OUTOF10: 8
PAINLEVEL_OUTOF10: 8

## 2024-09-11 ASSESSMENT — PAIN DESCRIPTION - LOCATION: LOCATION: ABDOMEN

## 2024-09-11 ASSESSMENT — PAIN - FUNCTIONAL ASSESSMENT: PAIN_FUNCTIONAL_ASSESSMENT: 0-10

## 2024-09-25 ENCOUNTER — TRANSCRIBE ORDERS (OUTPATIENT)
Facility: HOSPITAL | Age: 44
End: 2024-09-25

## 2024-09-25 DIAGNOSIS — R10.13 EPIGASTRIC PAIN: ICD-10-CM

## 2024-09-25 DIAGNOSIS — R10.31 RLQ ABDOMINAL PAIN: Primary | ICD-10-CM

## 2024-10-07 ENCOUNTER — HOSPITAL ENCOUNTER (OUTPATIENT)
Facility: HOSPITAL | Age: 44
Discharge: HOME | End: 2024-10-10
Attending: INTERNAL MEDICINE
Payer: OTHER GOVERNMENT

## 2024-10-07 DIAGNOSIS — R10.31 RLQ ABDOMINAL PAIN: ICD-10-CM

## 2024-10-07 DIAGNOSIS — R10.13 EPIGASTRIC PAIN: ICD-10-CM

## 2024-10-07 LAB — CREAT BLD-MCNC: 0.7 MG/DL (ref 0.6–1.3)

## 2024-10-07 PROCEDURE — 74177 CT ABD & PELVIS W/CONTRAST: CPT

## 2024-10-07 PROCEDURE — 82565 ASSAY OF CREATININE: CPT

## 2024-10-07 PROCEDURE — 6360000004 HC RX CONTRAST MEDICATION: Performed by: INTERNAL MEDICINE

## 2024-10-07 RX ORDER — IOPAMIDOL 755 MG/ML
100 INJECTION, SOLUTION INTRAVASCULAR
Status: COMPLETED | OUTPATIENT
Start: 2024-10-07 | End: 2024-10-07

## 2024-10-07 RX ADMIN — IOPAMIDOL 100 ML: 755 INJECTION, SOLUTION INTRAVENOUS at 13:09

## 2024-11-18 ENCOUNTER — HOSPITAL ENCOUNTER (OUTPATIENT)
Facility: HOSPITAL | Age: 44
Discharge: HOME | End: 2024-11-21
Attending: INTERNAL MEDICINE
Payer: OTHER GOVERNMENT

## 2024-11-18 DIAGNOSIS — K76.9 LIVER LESION: ICD-10-CM

## 2024-11-18 PROCEDURE — 74178 CT ABD&PLV WO CNTR FLWD CNTR: CPT

## 2024-11-18 PROCEDURE — 6360000004 HC RX CONTRAST MEDICATION: Performed by: INTERNAL MEDICINE

## 2024-11-18 RX ORDER — IOPAMIDOL 755 MG/ML
100 INJECTION, SOLUTION INTRAVASCULAR
Status: COMPLETED | OUTPATIENT
Start: 2024-11-18 | End: 2024-11-18

## 2024-11-18 RX ADMIN — IOPAMIDOL 100 ML: 755 INJECTION, SOLUTION INTRAVENOUS at 09:33

## 2024-11-25 ENCOUNTER — TRANSCRIBE ORDERS (OUTPATIENT)
Facility: HOSPITAL | Age: 44
End: 2024-11-25

## 2024-11-25 DIAGNOSIS — C22.0 CARCINOMA OF LIVER (HCC): Primary | ICD-10-CM

## 2024-12-04 ENCOUNTER — HOSPITAL ENCOUNTER (OUTPATIENT)
Facility: HOSPITAL | Age: 44
Discharge: HOME | End: 2024-12-07
Attending: INTERNAL MEDICINE
Payer: OTHER GOVERNMENT

## 2024-12-04 VITALS — HEART RATE: 71 BPM | BODY MASS INDEX: 27.35 KG/M2 | TEMPERATURE: 98.4 F | HEIGHT: 68 IN | OXYGEN SATURATION: 100 %

## 2024-12-04 DIAGNOSIS — C22.0 CARCINOMA OF LIVER (HCC): ICD-10-CM

## 2024-12-04 PROCEDURE — 76705 ECHO EXAM OF ABDOMEN: CPT

## 2024-12-04 RX ORDER — MIDAZOLAM HYDROCHLORIDE 1 MG/ML
5 INJECTION, SOLUTION INTRAMUSCULAR; INTRAVENOUS PRN
Status: DISCONTINUED | OUTPATIENT
Start: 2024-12-04 | End: 2024-12-08 | Stop reason: HOSPADM

## 2024-12-04 RX ORDER — FENTANYL CITRATE 50 UG/ML
100 INJECTION, SOLUTION INTRAMUSCULAR; INTRAVENOUS PRN
Status: DISCONTINUED | OUTPATIENT
Start: 2024-12-04 | End: 2024-12-08 | Stop reason: HOSPADM

## 2024-12-04 NOTE — PROGRESS NOTES
0815 Patient brought  from waiting area for US Liver Bx. No anticoagulant and NPO after midnight confirmed, last solid food at 1600 12/3/2024 and took morning scheduled meds with sip of water at 0400. , Mert ([phone]) will be waiting within hospital.    0825 HANY Heaton arrived bedside and discussed concerning for a safe procedure due to not approved schedule.   0830 US tech brought US to obtain more images at bedside   HANY Heaton assessed with US to proceed the procedure.  0835 AO x 4, VS taken and stable  HANY Heaton states will discuss with IR doctor for the procedure.  1005 Patient still awaiting at    1015 HANY Rodríguez came to bedside and explained to patient the procedure cannot do it with Ultrasound guided because the region is cannot reach by US and too high risks involved.   HANY Heaton also stated will provide a detailed note for her doctor  1023  brought to    1030 HANY Heaton came back to  and explained to patient and  at bedside. Showed CT scanned image to patient and spouse why it is difficult to get in and explained involved risks.  Patient and  states \"It's frustrating but rather be safe.\"  1040 Patient and  escorted to lobby

## 2025-01-06 PROBLEM — E83.59 NEPHROCALCINOSIS: Status: RESOLVED | Noted: 2020-12-09 | Resolved: 2025-01-06

## 2025-01-06 PROBLEM — N20.0 NEPHROLITHIASIS: Status: ACTIVE | Noted: 2025-01-06

## 2025-01-06 PROBLEM — N29 NEPHROCALCINOSIS: Status: RESOLVED | Noted: 2020-12-09 | Resolved: 2025-01-06

## 2025-01-07 ENCOUNTER — HOSPITAL ENCOUNTER (OUTPATIENT)
Facility: HOSPITAL | Age: 45
Discharge: HOME | End: 2025-01-10
Attending: ORTHOPAEDIC SURGERY
Payer: OTHER GOVERNMENT

## 2025-01-07 DIAGNOSIS — M87.052 AVASCULAR NECROSIS OF BONE OF HIP, LEFT: ICD-10-CM

## 2025-01-07 PROCEDURE — 73721 MRI JNT OF LWR EXTRE W/O DYE: CPT

## 2025-02-03 ENCOUNTER — TRANSCRIBE ORDERS (OUTPATIENT)
Facility: HOSPITAL | Age: 45
End: 2025-02-03

## 2025-02-03 DIAGNOSIS — M25.532 LEFT WRIST PAIN: ICD-10-CM

## 2025-02-03 DIAGNOSIS — S63.502A SPRAIN OF LEFT WRIST, INITIAL ENCOUNTER: Primary | ICD-10-CM

## 2025-02-10 PROBLEM — R39.14 FEELING OF INCOMPLETE BLADDER EMPTYING: Status: ACTIVE | Noted: 2024-06-05

## 2025-02-13 ENCOUNTER — OFFICE VISIT (OUTPATIENT)
Age: 45
End: 2025-02-13
Payer: OTHER GOVERNMENT

## 2025-02-13 VITALS
WEIGHT: 155 LBS | SYSTOLIC BLOOD PRESSURE: 102 MMHG | HEART RATE: 79 BPM | HEIGHT: 68 IN | DIASTOLIC BLOOD PRESSURE: 63 MMHG | BODY MASS INDEX: 23.49 KG/M2

## 2025-02-13 DIAGNOSIS — M41.26 OTHER IDIOPATHIC SCOLIOSIS, LUMBAR REGION: ICD-10-CM

## 2025-02-13 DIAGNOSIS — R10.9 FLANK PAIN, CHRONIC: ICD-10-CM

## 2025-02-13 DIAGNOSIS — N28.1 RENAL CYST: Primary | ICD-10-CM

## 2025-02-13 DIAGNOSIS — G89.29 FLANK PAIN, CHRONIC: ICD-10-CM

## 2025-02-13 DIAGNOSIS — N20.0 NEPHROLITHIASIS: ICD-10-CM

## 2025-02-13 PROBLEM — R39.15 URGENCY OF MICTURITION: Status: RESOLVED | Noted: 2021-02-22 | Resolved: 2025-02-13

## 2025-02-13 LAB
BILIRUBIN, URINE, POC: NEGATIVE
BLOOD URINE, POC: ABNORMAL
GLUCOSE URINE, POC: NEGATIVE
KETONES, URINE, POC: NEGATIVE
LEUKOCYTE ESTERASE, URINE, POC: NEGATIVE
NITRITE, URINE, POC: NEGATIVE
PH, URINE, POC: 6.5 (ref 4.6–8)
PROTEIN,URINE, POC: NEGATIVE
SPECIFIC GRAVITY, URINE, POC: 1.02 (ref 1–1.03)
URINALYSIS CLARITY, POC: CLEAR
URINALYSIS COLOR, POC: YELLOW
UROBILINOGEN, POC: ABNORMAL

## 2025-02-13 PROCEDURE — 99214 OFFICE O/P EST MOD 30 MIN: CPT | Performed by: UROLOGY

## 2025-02-13 PROCEDURE — 81003 URINALYSIS AUTO W/O SCOPE: CPT | Performed by: UROLOGY

## 2025-02-13 ASSESSMENT — ENCOUNTER SYMPTOMS: BACK PAIN: 1

## 2025-02-13 NOTE — PROGRESS NOTES
Chief Complaint   Patient presents with    Follow-up      kidney stone    Lower Back Pain     1. Have you been to the ER, urgent care clinic since your last visit?  Hospitalized since your last visit?No    2. Have you seen or consulted any other health care providers outside of the [facility] since your last visit?  Include any pap smears or colon screening. No  /63 (Site: Left Upper Arm, Position: Sitting, Cuff Size: Medium Adult)   Pulse 79   Ht 1.727 m (5' 8\")   Wt 70.3 kg (155 lb)   BMI 23.57 kg/m²

## 2025-02-13 NOTE — ASSESSMENT & PLAN NOTE
Simple appearing or hemorrhagic renal cysts.  They are not concerning and do not warrant further evaluation.  She was reassured.

## 2025-02-13 NOTE — ASSESSMENT & PLAN NOTE
She has a punctate right upper pole stone.  It is nonobstructing and not related to her current symptoms.  If it did move, she would likely pass it without surgical intervention.  I do not recommend treatment at this time.

## 2025-02-13 NOTE — ASSESSMENT & PLAN NOTE
She has chronic low back pain.  She does not have evidence of renal colic.  There is likely musculoskeletal pain.  She has severe lumbar scoliosis and this is likely the etiology.  She has declined surgical management in the past.  She was reassured that she does not need kidney stone treatment.

## 2025-02-13 NOTE — PROGRESS NOTES
HISTORY OF PRESENT ILLNESS  Sommer Betancourt is a 44 y.o. female.   has a past medical history of Arrhythmia, Arthritis, Burning with urination, Chronic pain, GERD (gastroesophageal reflux disease), History of esophagogastroduodenoscopy (EGD), Hypertension, Hypothyroidism, Motion sickness, Nausea & vomiting, Psychiatric disorder, Thromboembolus (HCC), and Thyroid disease.  has a past surgical history that includes Knee arthroscopy (Right); Knee arthroscopy (Right); Knee arthroscopy (Right); Knee arthroscopy (Right);  section ();  section (); other surgical history (2018); Gastric bypass surgery (2016); Cholecystectomy (); Urological Surgery (2020); Colonoscopy (N/A, 2024); Colonoscopy (2024); Hip arthroscopy (Left); Cystostomy w/ bladder biopsy (2020); and liver biopsy ().  Chief Complaint   Patient presents with    Follow-up      kidney stone    Lower Back Pain     She has >6 months of right flank pain radiating to the front.  It is moderate and achy in quality.  Gradual in onset and intermittent.  She states it has been going on a long time.    She states her nephrologist suggested she see me for an evaluation.  She inquires about kidney stone treatment and a renal biopsy.    She last had a CT scan in 2024.  I reviewed the images.  She has a 2.5 mm upper pole punctate calculus on the right.  There is no hydronephrosis.  She has a few left renal cysts which are nonconcerning.    She has lumbar scoliosis with osteophyte formation.  She states she has known about this and does not want surgical management.        1. Renal cyst  Overview:  Bilateral renal cysts; simple appearing up to 15 mm ().  Assessment & Plan:  Simple appearing or hemorrhagic renal cysts.  They are not concerning and do not warrant further evaluation.  She was reassured.  Orders:  -     AMB POC URINALYSIS DIP STICK AUTO W/O MICRO  -     Urinalysis with Microscopic  2.

## 2025-02-14 LAB
APPEARANCE UR: CLEAR
BACTERIA #/AREA URNS HPF: ABNORMAL /[HPF]
BILIRUB UR QL STRIP: ABNORMAL
CASTS URNS QL MICRO: ABNORMAL /LPF
COLOR UR: YELLOW
CRYSTALS URNS MICRO: ABNORMAL
EPI CELLS #/AREA URNS HPF: ABNORMAL /HPF (ref 0–10)
GLUCOSE UR QL STRIP: NEGATIVE
HGB UR QL STRIP: NEGATIVE
KETONES UR QL STRIP: ABNORMAL
LEUKOCYTE ESTERASE UR QL STRIP: ABNORMAL
MICRO URNS: ABNORMAL
NITRITE UR QL STRIP: NEGATIVE
PH UR STRIP: 6.5 [PH] (ref 5–7.5)
PROT UR QL STRIP: NEGATIVE
RBC #/AREA URNS HPF: ABNORMAL /HPF (ref 0–2)
SP GR UR STRIP: 1.03 (ref 1–1.03)
UNIDENT CRYS URNS QL MICRO: PRESENT
UROBILINOGEN UR STRIP-MCNC: 0.2 MG/DL (ref 0.2–1)
WBC #/AREA URNS HPF: ABNORMAL /HPF (ref 0–5)

## 2025-03-05 PROBLEM — M87.052 AVASCULAR NECROSIS OF BONE OF LEFT HIP (HCC): Status: ACTIVE | Noted: 2025-03-05

## 2025-03-14 NOTE — PROGRESS NOTES
Micro pending The patient has mobility limitations that increase their falls risk and impair their endurance. At times, they are limited to staying in one room due to fluctuating pain levels & balance deficits related to post operative weakness. The patient will require a commode to attend safely to their toileting needs. The patient has a mobility limitation that significantly impairs their ability to participate independently in mobility-related activities of daily living (MRADLs) in the home. This functional deficit can be sufficiently resolved with the use of 2-wheeled rolling walker. Pt deferred 3/1 commode.

## 2025-03-31 NOTE — PERIOP NOTE
Called [facility]  to request recent cardiac notes, clearance note , EKG. Spoke with Tasha , fax # given [phone]. Awaiting fax.    Received recent lab results from 02- from Dr. Jamir Francis -PCP and placed filed in folder under date of surgery.  
Nae Nelson MA [place], [place]  Patient was originally scheduled at [facility] on 3/24, she's been moved to 4/7 at [facility]      PCP obtained labs and EKG, referred to cardiology for clearance. Her cardiac clearance appointment is on 3/18  
soap will be provided at your Preadmission Testing (PAT) appointment.  If you do not have a PAT appointment before surgery, you may arrange to  CHG soap from our office or purchase CHG soap at a pharmacy, grocery or department store.  You need to purchase TWO 4 ounce bottles to use for your 2 showers.    Shower with CHG soap 2 times before your surgery  The evening before your surgery  The morning of your surgery    Steps to follow:  Wash your hair with your normal shampoo and your body with regular soap and rinse well to remove shampoo and soap from your skin.  Wet a clean washcloth and turn off the shower.  Put CHG soap on washcloth and apply to your entire body from the neck down. Do not use on your head, face or private parts(genitals). Do not use CHG soap on open sores, wounds or areas of skin irritation.  Wash your body gently for 5 minutes. Do not wash your skin too hard. This soap does not create lather. Pay special attention to your underarms and from your belly button to your feet.  Turn the shower back on and rinse well to get CHG soap off your body.  Pat your skin dry with a clean, dry towel. Do not apply lotions or moisturizer.  Put on clean clothes and sleep on fresh bed sheets and do not allow pets to sleep with you.      Tips to help prevent infections after your surgery:  Protect your surgical wound from germs:  Hand washing is the most important thing you and your caregivers can do to prevent infections.  Keep your bandage clean and dry!  Do not touch your surgical wound.  Use clean, freshly washed towels and washcloths every time you shower; do not share bath linens with others.  Until your surgical wound is healed, wear clothing and sleep on bed linens that are clean.  Do not allow pets to sleep in your bed with you or touch your surgical wound.  Do not smoke - smoking delays wound healing. This may be a good time to stop smoking.  If you have diabetes, it is important for you to manage

## 2025-04-03 RX ORDER — POLYETHYLENE GLYCOL 3350 17 G/17G
17 POWDER, FOR SOLUTION ORAL DAILY
Status: CANCELLED | OUTPATIENT
Start: 2025-04-03

## 2025-04-03 RX ORDER — ONDANSETRON 4 MG/1
4 TABLET, ORALLY DISINTEGRATING ORAL EVERY 8 HOURS PRN
Status: CANCELLED | OUTPATIENT
Start: 2025-04-03

## 2025-04-03 RX ORDER — ONDANSETRON 2 MG/ML
4 INJECTION INTRAMUSCULAR; INTRAVENOUS EVERY 6 HOURS PRN
Status: CANCELLED | OUTPATIENT
Start: 2025-04-03

## 2025-04-03 RX ORDER — 0.9 % SODIUM CHLORIDE 0.9 %
500 INTRAVENOUS SOLUTION INTRAVENOUS
Status: CANCELLED | OUTPATIENT
Start: 2025-04-03

## 2025-04-03 RX ORDER — METOPROLOL SUCCINATE 50 MG/1
50 TABLET, EXTENDED RELEASE ORAL EVERY MORNING
Status: CANCELLED | OUTPATIENT
Start: 2025-04-03

## 2025-04-03 RX ORDER — LEVOTHYROXINE SODIUM 100 UG/1
100 TABLET ORAL
Status: CANCELLED | OUTPATIENT
Start: 2025-04-04

## 2025-04-03 RX ORDER — SODIUM CHLORIDE 9 MG/ML
INJECTION, SOLUTION INTRAVENOUS PRN
Status: CANCELLED | OUTPATIENT
Start: 2025-04-03

## 2025-04-03 RX ORDER — HYDROXYZINE HYDROCHLORIDE 10 MG/1
10 TABLET, FILM COATED ORAL EVERY 8 HOURS PRN
Status: CANCELLED | OUTPATIENT
Start: 2025-04-03

## 2025-04-03 RX ORDER — OXYCODONE HYDROCHLORIDE 5 MG/1
10 TABLET ORAL EVERY 4 HOURS PRN
Refills: 0 | Status: CANCELLED | OUTPATIENT
Start: 2025-04-03

## 2025-04-03 RX ORDER — SODIUM CHLORIDE 0.9 % (FLUSH) 0.9 %
5-40 SYRINGE (ML) INJECTION PRN
Status: CANCELLED | OUTPATIENT
Start: 2025-04-03

## 2025-04-03 RX ORDER — ASPIRIN 325 MG
325 TABLET, DELAYED RELEASE (ENTERIC COATED) ORAL 2 TIMES DAILY
Status: CANCELLED | OUTPATIENT
Start: 2025-04-03

## 2025-04-03 RX ORDER — SENNA AND DOCUSATE SODIUM 50; 8.6 MG/1; MG/1
1 TABLET, FILM COATED ORAL 2 TIMES DAILY
Status: CANCELLED | OUTPATIENT
Start: 2025-04-03

## 2025-04-03 RX ORDER — PANTOPRAZOLE SODIUM 40 MG/1
40 TABLET, DELAYED RELEASE ORAL
Status: CANCELLED | OUTPATIENT
Start: 2025-04-03

## 2025-04-03 RX ORDER — MORPHINE SULFATE 4 MG/ML
2 INJECTION, SOLUTION INTRAMUSCULAR; INTRAVENOUS
Refills: 0 | Status: CANCELLED | OUTPATIENT
Start: 2025-04-03

## 2025-04-03 RX ORDER — OXYCODONE HYDROCHLORIDE 5 MG/1
5 TABLET ORAL EVERY 4 HOURS PRN
Refills: 0 | Status: CANCELLED | OUTPATIENT
Start: 2025-04-03

## 2025-04-03 RX ORDER — KETOROLAC TROMETHAMINE 30 MG/ML
15 INJECTION, SOLUTION INTRAMUSCULAR; INTRAVENOUS EVERY 6 HOURS
Status: CANCELLED | OUTPATIENT
Start: 2025-04-03 | End: 2025-04-04

## 2025-04-03 RX ORDER — BISACODYL 5 MG/1
5 TABLET, DELAYED RELEASE ORAL DAILY
Status: CANCELLED | OUTPATIENT
Start: 2025-04-03

## 2025-04-03 RX ORDER — SODIUM CHLORIDE 0.9 % (FLUSH) 0.9 %
5-40 SYRINGE (ML) INJECTION EVERY 12 HOURS SCHEDULED
Status: CANCELLED | OUTPATIENT
Start: 2025-04-03

## 2025-04-03 RX ORDER — CETIRIZINE HYDROCHLORIDE 10 MG/1
5 TABLET ORAL DAILY
Status: CANCELLED | OUTPATIENT
Start: 2025-04-03

## 2025-04-03 NOTE — H&P
Sommer Betancourt (: 1980) is a 44 y.o. female, patient, here for evaluation of the following chief complaint(s):  Results (Discuss left hip MRI results )        HPI:     Chief complaint is left hip pain.  Patient and I discussed her symptoms again in detail.  She states that her hip constantly aches.  She feels it in her groin.  She has lost hip range of motion.  She is status post prior hip arthroscopic surgery.  Send her for MRI which is available for review today to evaluate the extent of her articular cartilage loss and more accurately stage her hip.  MRI is documented in the results section but does show high-grade loss of the articular cartilage of the left hip joint.     Patient states that she is limited in her abilities to walk long distances.  The pain awakens her from sleep.  She has problems getting shoes and socks on and performing her usual activities daily living.  This has been an ongoing problem now.  Initially started treating her over 4 years ago for her left hip issues.  Allergies         Allergies   Allergen Reactions    Diphenhydramine Shortness Of Breath, Hives and Other (See Comments)       Other Reaction(s): Difficulty breathing, Difficulty breathing, Other: pt states cant breathe, Difficulty breathing, Other: pt states cant breathe     Reaction Type: Allergy     breathing    Fexofenadine Shortness Of Breath, Hives and Other (See Comments)       Other Reaction(s): Hives, Shortness of breath/wheezing, SHORTNESS OF BREATH, Unknown, wheezing     Reaction Type: Allergy; Reaction(s): allergy     Reaction(s): Unknown; Note: groggy; SOB    Adhesive Tape Other (See Comments)       Other Reaction(s): RASH    Amoxicillin Other (See Comments)       Yeast infection    Brompheniramine         Other Reaction(s): Difficulty breathing     cant breathe    Other         SURGICAL GLUE    Penicillins Other (See Comments)       Other Reaction(s): Other Reaction     Reaction Type: Allergy; Reaction(s):

## 2025-04-04 NOTE — DISCHARGE INSTRUCTIONS
sugars greater than 180  Persistent headache or dizziness  Coughing or congestion  Constipation or diarrhea  Burning when you go to the bathroom  Abnormal heart rate (fast or slow)      Call 911 and go to the nearest hospital for:   Sudden increased shortness of breath  Sudden onset of chest pain  Difficulty breathing  Localized chest pain with coughing or taking a deep breath     ______________________________________________________________________    Anesthesia Discharge Instructions    After general anesthesia or intervenous sedation, for 24 hours or while taking prescription Narcotics:  Limit your activities  Do not drive or operate hazardous machinery  If you have not urinated within 8 hours after discharge, please contact your surgeon on call.  Do not make important personal or business decisions  Do not drink alcoholic beverages    Report the following to your surgeon:  Excessive pain, swelling, redness or odor of or around the surgical area  Temperature over 100.5 degrees  Nausea and vomiting lasting longer than 4 hours or if unable to take medication  Any signs of decreased circulation or nerve impairment to extremity:  Change in color, persistent numbness, tingling, coldness or increased pain.  Any questions

## 2025-04-05 ENCOUNTER — ANESTHESIA EVENT (OUTPATIENT)
Facility: HOSPITAL | Age: 45
End: 2025-04-05
Payer: OTHER GOVERNMENT

## 2025-04-07 ENCOUNTER — HOSPITAL ENCOUNTER (OUTPATIENT)
Facility: HOSPITAL | Age: 45
Setting detail: OBSERVATION
Discharge: HOME | End: 2025-04-07
Attending: ORTHOPAEDIC SURGERY | Admitting: ORTHOPAEDIC SURGERY
Payer: OTHER GOVERNMENT

## 2025-04-07 ENCOUNTER — ANESTHESIA (OUTPATIENT)
Facility: HOSPITAL | Age: 45
End: 2025-04-07
Payer: OTHER GOVERNMENT

## 2025-04-07 ENCOUNTER — APPOINTMENT (OUTPATIENT)
Facility: HOSPITAL | Age: 45
End: 2025-04-07
Attending: ORTHOPAEDIC SURGERY
Payer: OTHER GOVERNMENT

## 2025-04-07 VITALS
BODY MASS INDEX: 25.01 KG/M2 | TEMPERATURE: 98.6 F | DIASTOLIC BLOOD PRESSURE: 63 MMHG | WEIGHT: 165 LBS | HEIGHT: 68 IN | OXYGEN SATURATION: 99 % | HEART RATE: 78 BPM | RESPIRATION RATE: 20 BRPM | SYSTOLIC BLOOD PRESSURE: 114 MMHG

## 2025-04-07 DIAGNOSIS — Z96.642 S/P TOTAL LEFT HIP ARTHROPLASTY: Primary | ICD-10-CM

## 2025-04-07 PROBLEM — M16.12 ARTHRITIS OF LEFT HIP: Status: ACTIVE | Noted: 2025-04-07

## 2025-04-07 LAB
GLUCOSE BLD STRIP.AUTO-MCNC: 89 MG/DL (ref 65–117)
SERVICE CMNT-IMP: NORMAL

## 2025-04-07 PROCEDURE — 97535 SELF CARE MNGMENT TRAINING: CPT

## 2025-04-07 PROCEDURE — 2580000003 HC RX 258: Performed by: ANESTHESIOLOGY

## 2025-04-07 PROCEDURE — 97161 PT EVAL LOW COMPLEX 20 MIN: CPT

## 2025-04-07 PROCEDURE — 7100000010 HC PHASE II RECOVERY - FIRST 15 MIN: Performed by: ORTHOPAEDIC SURGERY

## 2025-04-07 PROCEDURE — 97530 THERAPEUTIC ACTIVITIES: CPT

## 2025-04-07 PROCEDURE — 6360000002 HC RX W HCPCS: Performed by: PHYSICIAN ASSISTANT

## 2025-04-07 PROCEDURE — 2500000003 HC RX 250 WO HCPCS: Performed by: PHYSICIAN ASSISTANT

## 2025-04-07 PROCEDURE — 97116 GAIT TRAINING THERAPY: CPT

## 2025-04-07 PROCEDURE — G0378 HOSPITAL OBSERVATION PER HR: HCPCS

## 2025-04-07 PROCEDURE — 6360000002 HC RX W HCPCS: Performed by: ORTHOPAEDIC SURGERY

## 2025-04-07 PROCEDURE — 6360000002 HC RX W HCPCS

## 2025-04-07 PROCEDURE — 82962 GLUCOSE BLOOD TEST: CPT

## 2025-04-07 PROCEDURE — 7100000000 HC PACU RECOVERY - FIRST 15 MIN: Performed by: ORTHOPAEDIC SURGERY

## 2025-04-07 PROCEDURE — 7100000011 HC PHASE II RECOVERY - ADDTL 15 MIN: Performed by: ORTHOPAEDIC SURGERY

## 2025-04-07 PROCEDURE — 6370000000 HC RX 637 (ALT 250 FOR IP): Performed by: PHYSICIAN ASSISTANT

## 2025-04-07 PROCEDURE — 3600000005 HC SURGERY LEVEL 5 BASE: Performed by: ORTHOPAEDIC SURGERY

## 2025-04-07 PROCEDURE — 6360000002 HC RX W HCPCS: Performed by: ANESTHESIOLOGY

## 2025-04-07 PROCEDURE — 2500000003 HC RX 250 WO HCPCS

## 2025-04-07 PROCEDURE — 2580000003 HC RX 258: Performed by: ORTHOPAEDIC SURGERY

## 2025-04-07 PROCEDURE — 3700000000 HC ANESTHESIA ATTENDED CARE: Performed by: ORTHOPAEDIC SURGERY

## 2025-04-07 PROCEDURE — 2709999900 HC NON-CHARGEABLE SUPPLY: Performed by: ORTHOPAEDIC SURGERY

## 2025-04-07 PROCEDURE — 97165 OT EVAL LOW COMPLEX 30 MIN: CPT

## 2025-04-07 PROCEDURE — 3700000001 HC ADD 15 MINUTES (ANESTHESIA): Performed by: ORTHOPAEDIC SURGERY

## 2025-04-07 PROCEDURE — 2580000003 HC RX 258: Performed by: PHYSICIAN ASSISTANT

## 2025-04-07 PROCEDURE — 7100000001 HC PACU RECOVERY - ADDTL 15 MIN: Performed by: ORTHOPAEDIC SURGERY

## 2025-04-07 PROCEDURE — 6370000000 HC RX 637 (ALT 250 FOR IP): Performed by: ANESTHESIOLOGY

## 2025-04-07 PROCEDURE — 3600000015 HC SURGERY LEVEL 5 ADDTL 15MIN: Performed by: ORTHOPAEDIC SURGERY

## 2025-04-07 PROCEDURE — C1776 JOINT DEVICE (IMPLANTABLE): HCPCS | Performed by: ORTHOPAEDIC SURGERY

## 2025-04-07 PROCEDURE — P9045 ALBUMIN (HUMAN), 5%, 250 ML: HCPCS

## 2025-04-07 DEVICE — HIP H2 TOT ADV OTHER HD IMPL CAPPED SYNTHES: Type: IMPLANTABLE DEVICE | Site: HIP | Status: FUNCTIONAL

## 2025-04-07 DEVICE — PINNACLE GRIPTION ACETABULAR SHELL SECTOR 56MM OD
Type: IMPLANTABLE DEVICE | Site: HIP | Status: FUNCTIONAL
Brand: PINNACLE GRIPTION

## 2025-04-07 DEVICE — PINNACLE CANCELLOUS BONE SCREW 6.5MM X 30MM
Type: IMPLANTABLE DEVICE | Site: HIP | Status: FUNCTIONAL
Brand: PINNACLE

## 2025-04-07 DEVICE — APEX HOLE ELIMINATOR - PS
Type: IMPLANTABLE DEVICE | Site: HIP | Status: FUNCTIONAL
Brand: APEX

## 2025-04-07 DEVICE — PINNACLE HIP SOLUTIONS ALTRX POLYETHYLENE ACETABULAR LINER NEUTRAL 36MM ID 56MM OD
Type: IMPLANTABLE DEVICE | Site: HIP | Status: FUNCTIONAL
Brand: PINNACLE ALTRX

## 2025-04-07 DEVICE — BIOLOX DELTA CERAMIC FEMORAL HEAD +5.0 36MM DIA 12/14 TAPER
Type: IMPLANTABLE DEVICE | Site: HIP | Status: FUNCTIONAL
Brand: BIOLOX DELTA

## 2025-04-07 DEVICE — ACTIS DUOFIX HIP PROSTHESIS (FEMORAL STEM 12/14 TAPER CEMENTLESS SIZE 7 HIGH COLLAR)  CE
Type: IMPLANTABLE DEVICE | Site: HIP | Status: FUNCTIONAL
Brand: ACTIS

## 2025-04-07 DEVICE — PINNACLE CANCELLOUS BONE SCREW 6.5MM X 50MM
Type: IMPLANTABLE DEVICE | Site: HIP | Status: FUNCTIONAL
Brand: PINNACLE

## 2025-04-07 RX ORDER — PHENYLEPHRINE HCL IN 0.9% NACL 0.4MG/10ML
SYRINGE (ML) INTRAVENOUS
Status: DISCONTINUED | OUTPATIENT
Start: 2025-04-07 | End: 2025-04-07 | Stop reason: SDUPTHER

## 2025-04-07 RX ORDER — SODIUM CHLORIDE 9 MG/ML
INJECTION, SOLUTION INTRAVENOUS PRN
Status: DISCONTINUED | OUTPATIENT
Start: 2025-04-07 | End: 2025-04-07 | Stop reason: HOSPADM

## 2025-04-07 RX ORDER — LIDOCAINE HYDROCHLORIDE 20 MG/ML
INJECTION, SOLUTION EPIDURAL; INFILTRATION; INTRACAUDAL; PERINEURAL
Status: DISCONTINUED | OUTPATIENT
Start: 2025-04-07 | End: 2025-04-07 | Stop reason: SDUPTHER

## 2025-04-07 RX ORDER — HYDRALAZINE HYDROCHLORIDE 20 MG/ML
10 INJECTION INTRAMUSCULAR; INTRAVENOUS ONCE
Status: DISCONTINUED | OUTPATIENT
Start: 2025-04-07 | End: 2025-04-07 | Stop reason: HOSPADM

## 2025-04-07 RX ORDER — SODIUM CHLORIDE, SODIUM LACTATE, POTASSIUM CHLORIDE, CALCIUM CHLORIDE 600; 310; 30; 20 MG/100ML; MG/100ML; MG/100ML; MG/100ML
INJECTION, SOLUTION INTRAVENOUS CONTINUOUS
Status: DISCONTINUED | OUTPATIENT
Start: 2025-04-07 | End: 2025-04-07 | Stop reason: HOSPADM

## 2025-04-07 RX ORDER — ONDANSETRON 2 MG/ML
INJECTION INTRAMUSCULAR; INTRAVENOUS
Status: DISCONTINUED | OUTPATIENT
Start: 2025-04-07 | End: 2025-04-07 | Stop reason: SDUPTHER

## 2025-04-07 RX ORDER — ONDANSETRON 2 MG/ML
4 INJECTION INTRAMUSCULAR; INTRAVENOUS
Status: DISCONTINUED | OUTPATIENT
Start: 2025-04-07 | End: 2025-04-07 | Stop reason: HOSPADM

## 2025-04-07 RX ORDER — SODIUM CHLORIDE 9 MG/ML
INJECTION, SOLUTION INTRAVENOUS CONTINUOUS
Status: DISCONTINUED | OUTPATIENT
Start: 2025-04-07 | End: 2025-04-07 | Stop reason: HOSPADM

## 2025-04-07 RX ORDER — PROPOFOL 10 MG/ML
INJECTION, EMULSION INTRAVENOUS
Status: DISCONTINUED | OUTPATIENT
Start: 2025-04-07 | End: 2025-04-07 | Stop reason: SDUPTHER

## 2025-04-07 RX ORDER — OXYCODONE HYDROCHLORIDE 5 MG/1
5-10 TABLET ORAL EVERY 4 HOURS PRN
Qty: 42 TABLET | Refills: 0 | Status: SHIPPED | OUTPATIENT
Start: 2025-04-07 | End: 2025-04-14

## 2025-04-07 RX ORDER — DEXAMETHASONE SODIUM PHOSPHATE 4 MG/ML
INJECTION, SOLUTION INTRA-ARTICULAR; INTRALESIONAL; INTRAMUSCULAR; INTRAVENOUS; SOFT TISSUE
Status: DISCONTINUED | OUTPATIENT
Start: 2025-04-07 | End: 2025-04-07 | Stop reason: SDUPTHER

## 2025-04-07 RX ORDER — FENTANYL CITRATE 50 UG/ML
100 INJECTION, SOLUTION INTRAMUSCULAR; INTRAVENOUS
Status: DISCONTINUED | OUTPATIENT
Start: 2025-04-07 | End: 2025-04-07 | Stop reason: HOSPADM

## 2025-04-07 RX ORDER — ACETAMINOPHEN 325 MG/1
650 TABLET ORAL EVERY 6 HOURS
Status: DISCONTINUED | OUTPATIENT
Start: 2025-04-07 | End: 2025-04-07 | Stop reason: HOSPADM

## 2025-04-07 RX ORDER — FENTANYL CITRATE 50 UG/ML
25 INJECTION, SOLUTION INTRAMUSCULAR; INTRAVENOUS EVERY 5 MIN PRN
Status: COMPLETED | OUTPATIENT
Start: 2025-04-07 | End: 2025-04-07

## 2025-04-07 RX ORDER — SODIUM CHLORIDE 0.9 % (FLUSH) 0.9 %
5-40 SYRINGE (ML) INJECTION EVERY 12 HOURS SCHEDULED
Status: DISCONTINUED | OUTPATIENT
Start: 2025-04-07 | End: 2025-04-07 | Stop reason: HOSPADM

## 2025-04-07 RX ORDER — SCOPOLAMINE 1 MG/3D
1 PATCH, EXTENDED RELEASE TRANSDERMAL ONCE
Status: DISCONTINUED | OUTPATIENT
Start: 2025-04-07 | End: 2025-04-07 | Stop reason: HOSPADM

## 2025-04-07 RX ORDER — LIDOCAINE HYDROCHLORIDE 10 MG/ML
1 INJECTION, SOLUTION EPIDURAL; INFILTRATION; INTRACAUDAL; PERINEURAL
Status: COMPLETED | OUTPATIENT
Start: 2025-04-07 | End: 2025-04-07

## 2025-04-07 RX ORDER — NALOXONE HYDROCHLORIDE 0.4 MG/ML
INJECTION, SOLUTION INTRAMUSCULAR; INTRAVENOUS; SUBCUTANEOUS PRN
Status: DISCONTINUED | OUTPATIENT
Start: 2025-04-07 | End: 2025-04-07 | Stop reason: HOSPADM

## 2025-04-07 RX ORDER — SODIUM CHLORIDE 0.9 % (FLUSH) 0.9 %
5-40 SYRINGE (ML) INJECTION PRN
Status: DISCONTINUED | OUTPATIENT
Start: 2025-04-07 | End: 2025-04-07 | Stop reason: HOSPADM

## 2025-04-07 RX ORDER — SENNA AND DOCUSATE SODIUM 50; 8.6 MG/1; MG/1
1 TABLET, FILM COATED ORAL DAILY
Qty: 30 TABLET | Refills: 1 | Status: SHIPPED | OUTPATIENT
Start: 2025-04-07

## 2025-04-07 RX ORDER — ASPIRIN 325 MG
325 TABLET ORAL 2 TIMES DAILY
Qty: 60 TABLET | Refills: 0 | Status: SHIPPED | OUTPATIENT
Start: 2025-04-07

## 2025-04-07 RX ORDER — MIDAZOLAM HYDROCHLORIDE 2 MG/2ML
2 INJECTION, SOLUTION INTRAMUSCULAR; INTRAVENOUS PRN
Status: DISCONTINUED | OUTPATIENT
Start: 2025-04-07 | End: 2025-04-07 | Stop reason: HOSPADM

## 2025-04-07 RX ORDER — ALBUMIN HUMAN 50 G/1000ML
SOLUTION INTRAVENOUS
Status: DISCONTINUED | OUTPATIENT
Start: 2025-04-07 | End: 2025-04-07 | Stop reason: SDUPTHER

## 2025-04-07 RX ORDER — PROCHLORPERAZINE EDISYLATE 5 MG/ML
5 INJECTION INTRAMUSCULAR; INTRAVENOUS
Status: DISCONTINUED | OUTPATIENT
Start: 2025-04-07 | End: 2025-04-07 | Stop reason: HOSPADM

## 2025-04-07 RX ORDER — EPHEDRINE SULFATE 50 MG/ML
INJECTION INTRAVENOUS
Status: DISCONTINUED | OUTPATIENT
Start: 2025-04-07 | End: 2025-04-07 | Stop reason: SDUPTHER

## 2025-04-07 RX ORDER — DEXMEDETOMIDINE HYDROCHLORIDE 100 UG/ML
INJECTION, SOLUTION INTRAVENOUS
Status: DISCONTINUED | OUTPATIENT
Start: 2025-04-07 | End: 2025-04-07 | Stop reason: SDUPTHER

## 2025-04-07 RX ORDER — OXYCODONE HYDROCHLORIDE 5 MG/1
5 TABLET ORAL
Status: COMPLETED | OUTPATIENT
Start: 2025-04-07 | End: 2025-04-07

## 2025-04-07 RX ORDER — SUCCINYLCHOLINE/SOD CL,ISO/PF 200MG/10ML
SYRINGE (ML) INTRAVENOUS
Status: DISCONTINUED | OUTPATIENT
Start: 2025-04-07 | End: 2025-04-07 | Stop reason: SDUPTHER

## 2025-04-07 RX ORDER — ROCURONIUM BROMIDE 10 MG/ML
INJECTION, SOLUTION INTRAVENOUS
Status: DISCONTINUED | OUTPATIENT
Start: 2025-04-07 | End: 2025-04-07 | Stop reason: SDUPTHER

## 2025-04-07 RX ORDER — HYDROMORPHONE HYDROCHLORIDE 1 MG/ML
0.5 INJECTION, SOLUTION INTRAMUSCULAR; INTRAVENOUS; SUBCUTANEOUS EVERY 5 MIN PRN
Status: COMPLETED | OUTPATIENT
Start: 2025-04-07 | End: 2025-04-07

## 2025-04-07 RX ORDER — SCOPOLAMINE 1 MG/3D
1 PATCH, EXTENDED RELEASE TRANSDERMAL
Status: CANCELLED | OUTPATIENT
Start: 2025-04-07

## 2025-04-07 RX ORDER — FENTANYL CITRATE 50 UG/ML
INJECTION, SOLUTION INTRAMUSCULAR; INTRAVENOUS
Status: DISCONTINUED | OUTPATIENT
Start: 2025-04-07 | End: 2025-04-07 | Stop reason: SDUPTHER

## 2025-04-07 RX ORDER — ONDANSETRON 4 MG/1
4 TABLET, ORALLY DISINTEGRATING ORAL EVERY 8 HOURS PRN
Qty: 20 TABLET | Refills: 1 | Status: SHIPPED | OUTPATIENT
Start: 2025-04-07

## 2025-04-07 RX ORDER — MIDAZOLAM HYDROCHLORIDE 1 MG/ML
INJECTION, SOLUTION INTRAMUSCULAR; INTRAVENOUS
Status: DISCONTINUED | OUTPATIENT
Start: 2025-04-07 | End: 2025-04-07 | Stop reason: SDUPTHER

## 2025-04-07 RX ORDER — ACETAMINOPHEN 500 MG
1000 TABLET ORAL ONCE
Status: COMPLETED | OUTPATIENT
Start: 2025-04-07 | End: 2025-04-07

## 2025-04-07 RX ADMIN — Medication 80 MCG: at 09:44

## 2025-04-07 RX ADMIN — Medication 60 MCG: at 07:58

## 2025-04-07 RX ADMIN — HYDROMORPHONE HYDROCHLORIDE 0.5 MG: 1 INJECTION, SOLUTION INTRAMUSCULAR; INTRAVENOUS; SUBCUTANEOUS at 11:42

## 2025-04-07 RX ADMIN — PROPOFOL 200 MG: 10 INJECTION, EMULSION INTRAVENOUS at 07:32

## 2025-04-07 RX ADMIN — ROCURONIUM BROMIDE 10 MG: 10 INJECTION, SOLUTION INTRAVENOUS at 08:13

## 2025-04-07 RX ADMIN — FENTANYL CITRATE 25 MCG: 50 INJECTION INTRAMUSCULAR; INTRAVENOUS at 11:12

## 2025-04-07 RX ADMIN — ACETAMINOPHEN 650 MG: 325 TABLET ORAL at 14:18

## 2025-04-07 RX ADMIN — Medication 60 MCG: at 08:34

## 2025-04-07 RX ADMIN — FENTANYL CITRATE 25 MCG: 50 INJECTION INTRAMUSCULAR; INTRAVENOUS at 11:50

## 2025-04-07 RX ADMIN — FENTANYL CITRATE 50 MCG: 50 INJECTION INTRAMUSCULAR; INTRAVENOUS at 07:32

## 2025-04-07 RX ADMIN — FENTANYL CITRATE 50 MCG: 50 INJECTION INTRAMUSCULAR; INTRAVENOUS at 07:44

## 2025-04-07 RX ADMIN — ROCURONIUM BROMIDE 5 MG: 10 INJECTION, SOLUTION INTRAVENOUS at 07:32

## 2025-04-07 RX ADMIN — FENTANYL CITRATE 25 MCG: 50 INJECTION INTRAMUSCULAR; INTRAVENOUS at 12:40

## 2025-04-07 RX ADMIN — Medication 30 MG: at 07:32

## 2025-04-07 RX ADMIN — DEXAMETHASONE SODIUM PHOSPHATE 8 MG: 4 INJECTION INTRA-ARTICULAR; INTRALESIONAL; INTRAMUSCULAR; INTRAVENOUS; SOFT TISSUE at 07:39

## 2025-04-07 RX ADMIN — LIDOCAINE HYDROCHLORIDE 80 MG: 20 INJECTION, SOLUTION EPIDURAL; INFILTRATION; INTRACAUDAL; PERINEURAL at 07:32

## 2025-04-07 RX ADMIN — LIDOCAINE HYDROCHLORIDE 1 ML: 10 INJECTION, SOLUTION EPIDURAL; INFILTRATION; INTRACAUDAL; PERINEURAL at 06:46

## 2025-04-07 RX ADMIN — SUGAMMADEX 150 MG: 100 INJECTION, SOLUTION INTRAVENOUS at 09:42

## 2025-04-07 RX ADMIN — HYDROMORPHONE HYDROCHLORIDE 0.5 MG: 1 INJECTION, SOLUTION INTRAMUSCULAR; INTRAVENOUS; SUBCUTANEOUS at 09:36

## 2025-04-07 RX ADMIN — Medication 80 MCG: at 08:59

## 2025-04-07 RX ADMIN — WATER 2000 MG: 1 INJECTION INTRAMUSCULAR; INTRAVENOUS; SUBCUTANEOUS at 14:21

## 2025-04-07 RX ADMIN — Medication 60 MCG: at 08:52

## 2025-04-07 RX ADMIN — Medication 100 MG: at 07:33

## 2025-04-07 RX ADMIN — SODIUM CHLORIDE, SODIUM LACTATE, POTASSIUM CHLORIDE, AND CALCIUM CHLORIDE: .6; .31; .03; .02 INJECTION, SOLUTION INTRAVENOUS at 06:45

## 2025-04-07 RX ADMIN — Medication 40 MCG: at 09:08

## 2025-04-07 RX ADMIN — ALBUMIN (HUMAN) 12.5 G: 12.5 INJECTION, SOLUTION INTRAVENOUS at 09:16

## 2025-04-07 RX ADMIN — ACETAMINOPHEN 1000 MG: 500 TABLET ORAL at 06:35

## 2025-04-07 RX ADMIN — FENTANYL CITRATE 25 MCG: 50 INJECTION INTRAMUSCULAR; INTRAVENOUS at 10:43

## 2025-04-07 RX ADMIN — ROCURONIUM BROMIDE 35 MG: 10 INJECTION, SOLUTION INTRAVENOUS at 07:39

## 2025-04-07 RX ADMIN — Medication 60 MCG: at 07:47

## 2025-04-07 RX ADMIN — ONDANSETRON 4 MG: 2 INJECTION, SOLUTION INTRAMUSCULAR; INTRAVENOUS at 09:00

## 2025-04-07 RX ADMIN — PROPOFOL 10 MG: 10 INJECTION, EMULSION INTRAVENOUS at 09:29

## 2025-04-07 RX ADMIN — OXYCODONE HYDROCHLORIDE 5 MG: 5 TABLET ORAL at 14:18

## 2025-04-07 RX ADMIN — HYDROMORPHONE HYDROCHLORIDE 0.5 MG: 1 INJECTION, SOLUTION INTRAMUSCULAR; INTRAVENOUS; SUBCUTANEOUS at 12:06

## 2025-04-07 RX ADMIN — PROPOFOL 20 MG: 10 INJECTION, EMULSION INTRAVENOUS at 09:38

## 2025-04-07 RX ADMIN — WATER 2000 MG: 1 INJECTION INTRAMUSCULAR; INTRAVENOUS; SUBCUTANEOUS at 07:51

## 2025-04-07 RX ADMIN — Medication 80 MCG: at 09:05

## 2025-04-07 RX ADMIN — EPHEDRINE SULFATE 5 MG: 50 INJECTION INTRAVENOUS at 07:55

## 2025-04-07 RX ADMIN — DEXMEDETOMIDINE 4 MCG: 100 INJECTION, SOLUTION INTRAVENOUS at 08:16

## 2025-04-07 RX ADMIN — Medication 60 MCG: at 08:28

## 2025-04-07 RX ADMIN — TRANEXAMIC ACID 1000 MG: 100 INJECTION, SOLUTION INTRAVENOUS at 07:51

## 2025-04-07 RX ADMIN — EPHEDRINE SULFATE 5 MG: 50 INJECTION INTRAVENOUS at 09:07

## 2025-04-07 RX ADMIN — MIDAZOLAM 2 MG: 1 INJECTION INTRAMUSCULAR; INTRAVENOUS at 07:28

## 2025-04-07 RX ADMIN — HYDROMORPHONE HYDROCHLORIDE 0.5 MG: 1 INJECTION, SOLUTION INTRAMUSCULAR; INTRAVENOUS; SUBCUTANEOUS at 08:02

## 2025-04-07 RX ADMIN — ROCURONIUM BROMIDE 10 MG: 10 INJECTION, SOLUTION INTRAVENOUS at 08:48

## 2025-04-07 ASSESSMENT — PAIN SCALES - GENERAL
PAINLEVEL_OUTOF10: 8
PAINLEVEL_OUTOF10: 8
PAINLEVEL_OUTOF10: 7
PAINLEVEL_OUTOF10: 9
PAINLEVEL_OUTOF10: 8
PAINLEVEL_OUTOF10: 8
PAINLEVEL_OUTOF10: 7

## 2025-04-07 ASSESSMENT — PAIN DESCRIPTION - ORIENTATION
ORIENTATION: LEFT

## 2025-04-07 ASSESSMENT — PAIN - FUNCTIONAL ASSESSMENT
PAIN_FUNCTIONAL_ASSESSMENT: NONE - DENIES PAIN
PAIN_FUNCTIONAL_ASSESSMENT: NONE - DENIES PAIN
PAIN_FUNCTIONAL_ASSESSMENT: 0-10

## 2025-04-07 ASSESSMENT — PAIN DESCRIPTION - LOCATION
LOCATION: HIP
LOCATION: HIP
LOCATION: ABDOMEN
LOCATION: HIP
LOCATION: ABDOMEN

## 2025-04-07 ASSESSMENT — PAIN DESCRIPTION - DESCRIPTORS
DESCRIPTORS: ACHING;DISCOMFORT
DESCRIPTORS: ACHING
DESCRIPTORS: ACHING;DISCOMFORT
DESCRIPTORS: ACHING;THROBBING;SORE

## 2025-04-07 NOTE — CARE COORDINATION
CM received a call from PACU, this patient is a fast track. FOC offered, HH referral sent, RW to be ordered through [employer].     CM attempted to order patient a RW, this patient has  [facility] which is not accepted, CM called patients spouse to explain their options for obtaining DME ([facility] or [facility] pharmacy has RW for sale.)     [facility] accepted for HH, CM added to AVS.     Kaylee Alarcon RN/CRM

## 2025-04-07 NOTE — OP NOTE
Name: Sommer Betancourt  MRN:  [id number]  : 1980  Age:  44 y.o.  Surgery Date: 2025      OPERATIVE REPORT - LEFT  conversionTOTAL HIP ARTHROPLASTY -   ANTERIOR APPROACH    PREOPERATIVE DIAGNOSIS: Osteoarthritis, left hip.    POSTOPERATIVE DIAGNOSIS: Osteoarthritis, left hip.    PROCEDURE PERFORMED: Left total hip arthroplasty.    SURGEON: Eddie Norwood MD    FIRST ASSISTANT: Crystal Osman PA-C, Rufino Leong MD    ANESTHESIA: General    PRE-OP ANTIBIOTIC: Ancef 2g    COMPLICATIONS: None.    ESTIMATED BLOOD LOSS: 300 mL.    SPECIMENS REMOVED: None.    COMPONENTS IMPLANTED:   Implant Name Type Inv. Item Serial No.  Lot No. LRB No. Used Action   ELIMINATOR H APEX FOR 48-60MM PINN HIP SHELL - SNA  ELIMINATOR H APEX FOR 48-60MM PINN HIP SHELL NA [facility] [facility] [id number] Left 1 Implanted   LINER ACET OD56MM ID36MM HIP ALTRX PINN - SNA  LINER ACET OD56MM ID36MM HIP ALTRX PINN NA [facility] [employer] [facility] [id number] Left 1 Implanted   CUP ACET [id number] HIP GRIPTION AUSTIN CEMENTLESS FIX SECT SER - SNA  CUP ACET [id number] HIP GRIPTION AUSTIN CEMENTLESS FIX SECT SER NA [facility] [employer] [facility] [id number] Left 1 Implanted   SCREW BNE L50MM DIA6.5MM CANC HIP DOME PINN - SNA  SCREW BNE L50MM DIA6.5MM CANC HIP DOME PINN NA [facility] [employer] [facility] [id number] Left 1 Implanted   SCREW BNE L30MM DIA6.5MM CANC HIP S STL GRIPTION FULL THRD - SNA  SCREW BNE L30MM DIA6.5MM CANC HIP S STL GRIPTION FULL THRD NA [facility] [employer] [facility] [id number] Left 1 Implanted   STEM FEM SZ 7 HIP HI OFFSET CLLRD CEMENTLESS 12/14 TAPR - SNA  STEM FEM SZ 7 HIP HI OFFSET CLLRD CEMENTLESS /14 TAPR NA [facility] [employer] [facility] [id number] Left 1 Implanted   HEAD FEM [id number] +5MM OFFSET 14 TAPR HIP CERAMIC BIOLOX - SNA  HEAD FEM [id number] +5MM OFFSET 14 TAPR HIP CERAMIC BIOLOX NA [facility] [employer] [facility] [id number] Left 1 Implanted       INDICATIONS: The patient is an 44 yrs female with

## 2025-04-07 NOTE — PROGRESS NOTES
Discharge instructions reviewed with patient and family using teachback. All questions have been answered. Prescriptions sent to [facility]. Vital signs stable, pain appropriately managed. Patient wheeled off the unit with PACU staff.     
PHYSICAL THERAPY EVALUATION/DISCHARGE    Patient: Sommer Betancourt (44 y.o. female)  Date: 4/7/2025  Primary Diagnosis: Avascular necrosis of bone of left hip (HCC) [M87.052]  Arthritis of left hip [M16.12]  Procedure(s) (LRB):  LEFT TOTAL HIP ARTHROPLASTY, ANTERIOR APPROACH (Left) * Day of Surgery *   Precautions: Restrictions/Precautions  Restrictions/Precautions: Weight Bearing, Fall Risk, Surgical protocol Lower Extremity Weight Bearing Restrictions  Left Lower Extremity Weight Bearing: Weight Bearing As Tolerated   Position Activity Restriction  Hip Precautions: Anterior hip precautions, No sudden or extreme motions      ASSESSMENT AND RECOMMENDATIONS:  Based on the objective data below, the patient presents POD# 0 Left THR with pain left hip, decreased AROM/strength and function left leg, decline in functional mobility, decreased activity tolerance and impaired standing balance/gait with RW.  Pt seen with  present in PACU for mobility and discharge education.  Pt safe with standby guard and cues for proper use of RW -  appropriately assisting pt as needed.  Pt performed stairs with rail/cane and CGA.   to procure RW and cane.  Pt cleared for discharge with assist of .      Discharge Instructions:  Reviewed and performed supine MARY exercise.   Reviewed and instructed in proper positioning to avoid external rotation in supine or reclined position - using blanket roll to support leg in neutral position.  Instructed pt in proper use of ice and elevation to decrease pain and swelling and progressive walking program as tolerated.  Patient instructed to have  provide standby guard when OOB/amb/stairs - for rest of day and during the night - due to potential decreases in BP - dizziness.   Pt able to verbalize understanding of discharge education.      Functional Outcome Measure:  The patient scored 21/24 on the AM-PAC outcome measure which is indicative of a Cutoff score <=171,2,3 
Patient scheduled as a fast track. Patient off monitor working with PT.  
walker     SUBJECTIVE:   Patient stated, “.”    OBJECTIVE DATA SUMMARY:     Past Medical History:   Diagnosis Date    Anxiety and depression     Arthritis     Burning with urination     Chronic low back pain     Family history of adverse effect to anesthesia     Father Stop Breathing    GERD (gastroesophageal reflux disease)     Heart palpitations 2019    History of esophagogastroduodenoscopy (EGD) x2    Hypertension     Hypothyroidism     Motion sickness     Nausea & vomiting     Postoperative hypotension     Thromboembolus (HCC) 2014    right leg DVT FOLLOWING KNEE SURGERY     Past Surgical History:   Procedure Laterality Date     SECTION  2012     SECTION  2004    CHOLECYSTECTOMY      COLONOSCOPY N/A 2024    COLONOSCOPY performed by James Agustin MD at [facility] ENDOSCOPY    COLONOSCOPY  2024    COLONOSCOPY POLYPECTOMY REMOVAL SNARE/STOMA performed by James Agustin MD at [facility] ENDOSCOPY    CYSTOSTOMY W/ BLADDER BIOPSY  2020    GASTRIC BYPASS SURGERY  2016    HIP ARTHROSCOPY Left     KNEE ARTHROSCOPY Right     KNEE ARTHROSCOPY Right     KNEE ARTHROSCOPY Right     KNEE ARTHROSCOPY Right     LIVER BIOPSY  2025    Benign liver bx    TOTAL BODY LIFT  2018    UROLOGICAL SURGERY  2020     retrograde pyelograms       Prior Level of Function/Environment/Context: Patient reported she lives with supportive  and was independent with ADLs/IADLs prior to admission.  ,  ,  ,  ,  ,  ,  ,  ,  ,  ,       Expanded or extensive additional review of patient history:   Social/Functional History  Lives With: Spouse  Bathroom Shower/Tub: Walk-in shower  Bathroom Equipment: Shower chair    Hand Dominance: right     EXAMINATION OF PERFORMANCE DEFICITS:    Cognitive/Behavioral Status:    Orientation  Overall Orientation Status: Within Normal Limits  Orientation Level: Oriented X4  Cognition  Overall Cognitive Status: WNL    Hearing:   Hearing  Hearing: Within functional

## 2025-04-07 NOTE — ANESTHESIA PRE PROCEDURE
Department of Anesthesiology  Preprocedure Note       Name:  Sommer Betancourt   Age:  44 y.o.  :  1980                                          MRN:  [id number]         Date:  2025      Surgeon: Surgeon(s):  Eddie Norwood MD    Procedure: Procedure(s):  LEFT TOTAL HIP ARTHROPLASTY, ANTERIOR APPROACH    Medications prior to admission:   Prior to Admission medications    Medication Sig Start Date End Date Taking? Authorizing Provider   Multiple Vitamins-Minerals (MULTIVITAMIN WOMEN PO) Take 1 tablet by mouth every morning   Yes Joe Yousif MD   FLUoxetine (PROZAC) 40 MG capsule Take 20 mg by mouth nightly 24  Yes Joe Yousif MD   pantoprazole (PROTONIX) 40 MG tablet take 1 tablet by mouth 1/2 TO 1 HOUR BEFORE MEALS TWICE DAILY   Yes Joe Yousif MD   baclofen (LIORESAL) 10 MG tablet Take 1 tablet by mouth nightly 11/15/23  Yes Joe Yousif MD   metoprolol succinate (TOPROL XL) 50 MG extended release tablet Take 1 tablet by mouth every morning 3/27/24  Yes Joe Yousif MD   Multiple Vitamin (MULTIVITAMIN PO) Take 1 tablet by mouth daily   Yes Automatic Reconciliation, Ar   levothyroxine (SYNTHROID) 100 MCG tablet Take by mouth every morning (before breakfast)   Yes Automatic Reconciliation, Ar   loratadine (CLARITIN) 10 MG tablet Take 1 tablet by mouth every morning   Yes Automatic Reconciliation, Ar   ondansetron (ZOFRAN-ODT) 4 MG disintegrating tablet Take 1 tablet by mouth every 6 hours as needed for Nausea 24   ProviderJoe MD       Current medications:    Current Facility-Administered Medications   Medication Dose Route Frequency Provider Last Rate Last Admin   • tranexamic acid (CYKLOKAPRON) 1,000 mg in sodium chloride 0.9 % 110 mL IVPB (addEASE)  1,000 mg IntraVENous On Call to OR Crystal Alarcon PA-C       • sodium chloride flush 0.9 % injection 5-40 mL  5-40 mL IntraVENous 2 times per day Crystal Alarcon PA-C       • sodium

## 2025-04-07 NOTE — ANESTHESIA POSTPROCEDURE EVALUATION
Department of Anesthesiology  Postprocedure Note    Patient: Sommer Betancourt  MRN: [id number]  YOB: 1980  Date of evaluation: 4/7/2025    Procedure Summary       Date: 04/07/25 Room / Location: [facility] MAIN OR [address] [place] [address] MAIN OR    Anesthesia Start: 0728 Anesthesia Stop: 0952    Procedure: LEFT TOTAL HIP ARTHROPLASTY, ANTERIOR APPROACH (Left: Hip) Diagnosis:       Avascular necrosis of bone of left hip (HCC)      (Avascular necrosis of bone of left hip (HCC) [M87.052])    Providers: Eddie Norwood MD Responsible Provider: Juan Villagran MD    Anesthesia Type: General ASA Status: 2            Anesthesia Type: General    Selena Phase I: Selena Score: 8    Selena Phase II:      Anesthesia Post Evaluation    Patient location during evaluation: PACU  Patient participation: complete - patient participated  Level of consciousness: awake  Airway patency: patent  Nausea & Vomiting: no nausea  Cardiovascular status: blood pressure returned to baseline and hemodynamically stable  Respiratory status: acceptable  Hydration status: stable  Multimodal analgesia pain management approach    No notable events documented.

## 2025-04-07 NOTE — H&P
Update History & Physical    The patient's History and Physical of April 3, 2025 was reviewed with the patient and I examined the patient. There was no change. The surgical site was confirmed by the patient and me.       Plan: The risks, benefits, expected outcome, and alternative to the recommended procedure have been discussed with the patient. Patient understands and wants to proceed with the procedure.     Electronically signed by Eddie Norwood MD on 4/7/2025 at 7:26 AM

## 2025-04-29 PROBLEM — M16.12 ARTHRITIS OF LEFT HIP: Status: RESOLVED | Noted: 2025-04-07 | Resolved: 2025-04-29

## 2025-04-29 PROBLEM — S73.192D ACETABULAR LABRUM TEAR, LEFT, SUBSEQUENT ENCOUNTER: Status: RESOLVED | Noted: 2020-06-15 | Resolved: 2025-04-29

## 2025-04-29 PROBLEM — M87.052 AVASCULAR NECROSIS OF BONE OF LEFT HIP (HCC): Status: RESOLVED | Noted: 2025-03-05 | Resolved: 2025-04-29

## 2025-08-03 ENCOUNTER — APPOINTMENT (OUTPATIENT)
Facility: HOSPITAL | Age: 45
End: 2025-08-03
Payer: OTHER GOVERNMENT

## 2025-08-03 ENCOUNTER — HOSPITAL ENCOUNTER (EMERGENCY)
Facility: HOSPITAL | Age: 45
Discharge: HOME | End: 2025-08-03
Attending: EMERGENCY MEDICINE
Payer: OTHER GOVERNMENT

## 2025-08-03 VITALS
DIASTOLIC BLOOD PRESSURE: 85 MMHG | SYSTOLIC BLOOD PRESSURE: 130 MMHG | HEART RATE: 70 BPM | BODY MASS INDEX: 23.95 KG/M2 | RESPIRATION RATE: 15 BRPM | OXYGEN SATURATION: 100 % | TEMPERATURE: 99.1 F | HEIGHT: 68 IN | WEIGHT: 158 LBS

## 2025-08-03 DIAGNOSIS — S29.011A MUSCLE STRAIN OF CHEST WALL, INITIAL ENCOUNTER: Primary | ICD-10-CM

## 2025-08-03 PROCEDURE — 99283 EMERGENCY DEPT VISIT LOW MDM: CPT

## 2025-08-03 PROCEDURE — 71101 X-RAY EXAM UNILAT RIBS/CHEST: CPT

## 2025-08-03 PROCEDURE — 6370000000 HC RX 637 (ALT 250 FOR IP): Performed by: EMERGENCY MEDICINE

## 2025-08-03 RX ORDER — LIDOCAINE 50 MG/G
1 PATCH TOPICAL DAILY
Qty: 14 PATCH | Refills: 0 | Status: SHIPPED | OUTPATIENT
Start: 2025-08-03 | End: 2025-08-17

## 2025-08-03 RX ORDER — ACETAMINOPHEN 325 MG/1
650 TABLET ORAL
Status: COMPLETED | OUTPATIENT
Start: 2025-08-03 | End: 2025-08-03

## 2025-08-03 RX ORDER — LIDOCAINE 4 G/G
1 PATCH TOPICAL DAILY
Status: DISCONTINUED | OUTPATIENT
Start: 2025-08-03 | End: 2025-08-03 | Stop reason: HOSPADM

## 2025-08-03 RX ORDER — HYDROCODONE BITARTRATE AND ACETAMINOPHEN 5; 325 MG/1; MG/1
1 TABLET ORAL
Refills: 0 | Status: COMPLETED | OUTPATIENT
Start: 2025-08-03 | End: 2025-08-03

## 2025-08-03 RX ADMIN — HYDROCODONE BITARTRATE AND ACETAMINOPHEN 1 TABLET: 5; 325 TABLET ORAL at 16:34

## 2025-08-03 RX ADMIN — ACETAMINOPHEN 650 MG: 325 TABLET ORAL at 16:34

## 2025-08-03 ASSESSMENT — PAIN SCALES - GENERAL
PAINLEVEL_OUTOF10: 10
PAINLEVEL_OUTOF10: 8

## 2025-08-03 ASSESSMENT — PAIN - FUNCTIONAL ASSESSMENT: PAIN_FUNCTIONAL_ASSESSMENT: 0-10

## (undated) DEVICE — STERILE POLYISOPRENE POWDER-FREE SURGICAL GLOVES: Brand: PROTEXIS

## (undated) DEVICE — BLADE ELECTRODE: Brand: EDGE

## (undated) DEVICE — SYRINGE 20ML LL S/C 50

## (undated) DEVICE — SOLUTION IRRIG 3000ML LAC R FLX CONT

## (undated) DEVICE — SURGICAL PROCEDURE PACK BASIN MAJ SET CUST NO CAUT

## (undated) DEVICE — STRAP,POSITIONING,KNEE/BODY,FOAM,4X60": Brand: MEDLINE

## (undated) DEVICE — ASPIRATOR FLR L72IN SUCT TB W/ 1 DETACH FISH STK PUSH HNDL

## (undated) DEVICE — REM POLYHESIVE ADULT PATIENT RETURN ELECTRODE: Brand: VALLEYLAB

## (undated) DEVICE — DRAPE EQUIP C ARM 74X42 IN MOB XR W/ TIE RUBBER BND LF

## (undated) DEVICE — WRAP SURG W1.31XL1.34M CARD FOR PT 165-172CM THERMOWRP

## (undated) DEVICE — SURGICAL PROCEDURE PACK CYSTO CHS

## (undated) DEVICE — TRAY PREP DRY W/ PREM GLV 2 APPL 6 SPNG 2 UNDPD 1 OVERWRAP

## (undated) DEVICE — SUTURE MONOCRYL SZ 4 0 L18IN ABSRB VLT PS 1 L24MM 3 8 CIR REV Y682H

## (undated) DEVICE — GLOVE SURG SZ 65 L12IN FNGR THK79MIL GRN LTX FREE

## (undated) DEVICE — HANDPIECE SET WITH COAXIAL HIGH FLOW TIP AND SUCTION TUBE: Brand: INTERPULSE

## (undated) DEVICE — SOLUTION IRRG STRL H2O 500 ML BTL 16/CA

## (undated) DEVICE — SYR LR LCK 1ML GRAD NSAF 30ML --

## (undated) DEVICE — SYR 10ML CTRL LR LCK NSAF LF --

## (undated) DEVICE — TOWEL SURG W17XL27IN STD BLU COT NONFENESTRATED PREWASHED

## (undated) DEVICE — ASTOUND STANDARD SURGICAL GOWN, XXL: Brand: CONVERTORS

## (undated) DEVICE — PAD,PREPPING,CUFFED,24X48,7",NONSTERILE: Brand: MEDLINE

## (undated) DEVICE — 3M™ IOBAN™ 2 ANTIMICROBIAL INCISE DRAPE 6648EZ: Brand: IOBAN™ 2

## (undated) DEVICE — SOLUTION IRRIG 3000ML 0.9% SOD CHL FLX CONT 0797208] ICU MEDICAL INC]

## (undated) DEVICE — MONOPOLAR CORD: Brand: VALLEYLAB

## (undated) DEVICE — PAD,ABDOMINAL,5"X9",ST,LF,25/BX: Brand: MEDLINE INDUSTRIES, INC.

## (undated) DEVICE — SUTURE VICRYL + SZ 2-0 L27IN ABSRB WHT SH 1/2 CIR TAPERCUT VCP417H

## (undated) DEVICE — SKIN MARKER,REGULAR TIP WITH RULER AND LABELS: Brand: DEVON

## (undated) DEVICE — TUBING PMP IRRIG GOFLO

## (undated) DEVICE — DRAPE,REIN 53X77,STERILE: Brand: MEDLINE

## (undated) DEVICE — 4.5 MM INCISOR PLUS ELITE LONG                                    SHAVER BLADES AND BURRS, POWER/EP-1,                                    SLATE, 18 CM WORKING LENGTH,                                    PACKAGED 3 PER BOX, STERILE

## (undated) DEVICE — 4-PORT MANIFOLD: Brand: NEPTUNE 2

## (undated) DEVICE — KENDALL SCD EXPRESS SLEEVES, KNEE LENGTH, MEDIUM: Brand: KENDALL SCD

## (undated) DEVICE — SMOKE EVACUATION PENCIL: Brand: VALLEYLAB

## (undated) DEVICE — NDL SPNE QNCKE 18GX3.5IN LF --

## (undated) DEVICE — SUT SLK 2-0SH 30IN BLK --

## (undated) DEVICE — TUBING, SUCTION, 1/4" X 12', STRAIGHT: Brand: MEDLINE

## (undated) DEVICE — DRAPE,U/ SHT,SPLIT,PLAS,STERIL: Brand: MEDLINE

## (undated) DEVICE — DRAPE,EXTREMITY,89X128,STERILE: Brand: MEDLINE

## (undated) DEVICE — SAMURAI BLADE FULL RADIUS: Brand: SAMURAI

## (undated) DEVICE — SOL IRR SOD CHL 0.9% TITAN XL CNTNR 3000ML

## (undated) DEVICE — GAUZE SPONGES,12 PLY: Brand: CURITY

## (undated) DEVICE — OCCLUSIVE GAUZE STRIP,3% BISMUTH TRIBROMOPHENATE IN PETROLATUM BLEND: Brand: XEROFORM

## (undated) DEVICE — SUTURE MCRYL SZ 2-0 L36IN ABSRB UD L36MM CT-1 1/2 CIR Y945H

## (undated) DEVICE — SIMPLICITY FLUFF UNDERPAD 23X36, MODERATE: Brand: SIMPLICITY

## (undated) DEVICE — 3M™ STERI-DRAPE™ U-DRAPE 1015: Brand: STERI-DRAPE™

## (undated) DEVICE — COVER LT HNDL PLAS RIG 1 PER PK

## (undated) DEVICE — PADDING CAST W6INXL4YD NONSTERILE COT RAYON MICROPLEATED

## (undated) DEVICE — Device

## (undated) DEVICE — ROCKER SWITCH PENCIL BLADE ELECTRODE, HOLSTER: Brand: EDGE

## (undated) DEVICE — MARKER,SKIN,WI/RULER AND LABELS: Brand: MEDLINE

## (undated) DEVICE — OPEN-END URETERAL CATHETER: Brand: COOK

## (undated) DEVICE — (D)PREP SKN CHLRAPRP APPL 26ML -- CONVERT TO ITEM 371833

## (undated) DEVICE — TOTAL JOINT - SMH: Brand: MEDLINE INDUSTRIES, INC.

## (undated) DEVICE — SUT ETHLN 3-0 18IN PS1 BLK --

## (undated) DEVICE — COVER,MAYO STAND,STERILE: Brand: MEDLINE

## (undated) DEVICE — STERILE POLYISOPRENE POWDER-FREE SURGICAL GLOVES WITH EMOLLIENT COATING: Brand: PROTEXIS

## (undated) DEVICE — TUBING, SUCTION, 9/32" X 12', STRAIGHT: Brand: MEDLINE INDUSTRIES, INC.

## (undated) DEVICE — YANKAUER,BULB TIP,W/O VENT,RIGID,STERILE: Brand: MEDLINE

## (undated) DEVICE — DRAPE XR C ARM 41X74IN LF --

## (undated) DEVICE — INTENDED FOR TISSUE SEPARATION, AND OTHER PROCEDURES THAT REQUIRE A SHARP SURGICAL BLADE TO PUNCTURE OR CUT.: Brand: BARD-PARKER ® CARBON RIB-BACK BLADES

## (undated) DEVICE — NEEDLE SPNL 22GA L3.5IN BLK HUB S STL REG WALL FIT STYL W/

## (undated) DEVICE — SUTURE VCRL SZ 1 L36IN ABSRB UD L36MM CT-1 1/2 CIR J947H

## (undated) DEVICE — SUTURE MCRYL SZ 4-0 L27IN ABSRB UD L19MM PS-2 1/2 CIR PRIM Y426H

## (undated) DEVICE — PADDING CST 6IN STERILE --

## (undated) DEVICE — DRSG AQUACEL SURG 3.5 X 10IN -- CONVERT TO ITEM 370183

## (undated) DEVICE — SUTURE VICRYL COATED ABSORBABLE BRAIDED 2-0 TP1 54 IN COAT UD VICRYL + VCP880T

## (undated) DEVICE — SUTURE PDS II SZ 2-0 L36IN ABSRB VLT CT L40MM 1/2 CIR TAPR Z357H

## (undated) DEVICE — DRAIN SURG 15FR L3/16IN DIA4.7MM SIL CHN RND FULL FLUT TRCR

## (undated) DEVICE — KIT INSTR HIP DISP FOR 2.9MM SYS PUSHLOCK

## (undated) DEVICE — STRYKER PERFORMANCE SERIES SAGITTAL BLADE: Brand: STRYKER PERFORMANCE SERIES

## (undated) DEVICE — GAUZE,SPONGE,4"X4",16PLY,STRL,LF,10/TRAY: Brand: MEDLINE

## (undated) DEVICE — BASIC PACK: Brand: CONVERTORS

## (undated) DEVICE — SPONGE LAPAROTOMY W4XL18IN WHT STRUNG RADPQ PREWASHED ST

## (undated) DEVICE — SLIM BODY SKIN STAPLER: Brand: APPOSE ULC

## (undated) DEVICE — GLOVE ORTHO 8   MSG9480

## (undated) DEVICE — TRAP FLUID BUFFALO FLTR

## (undated) DEVICE — SUTURE PDS II SZ 2-0 L27IN ABSRB VLT SH L26MM 1/2 CIR Z317H

## (undated) DEVICE — VINYL ACETATE UROLOGICAL DRAINAGE BAG FOR GE/OEC SYSTEMS W/ 8MM PLUG - NON-STERILE: Brand: CUSTOM MEDICAL SPECIALTIES, INC.

## (undated) DEVICE — SOLUTION IV 1000ML 0.9% SOD CHL

## (undated) DEVICE — GLOVE SURG SZ 65 L12IN FNGR THK94MIL STD WHT LTX FREE

## (undated) DEVICE — TRAY CATH OD16FR SIL URIN M STATLOK STBL DEV SURSTP

## (undated) DEVICE — ZIMMER® STERILE DISPOSABLE TOURNIQUET CUFF WITH PROTECTIVE SLEEVE AND PLC, DUAL PORT, SINGLE BLADDER, 34 IN. (86 CM)

## (undated) DEVICE — SOLUTION IRRIG 1000ML H2O STRL BLT

## (undated) DEVICE — CONTAINER,SPECIMEN,3OZ,OR STRL: Brand: MEDLINE

## (undated) DEVICE — Device: Brand: JELCO

## (undated) DEVICE — SUTURE MCRYL 2 0 L27IN ABSRB VLT CT MFIL Y351H

## (undated) DEVICE — HOOD, PEEL-AWAY: Brand: FLYTE

## (undated) DEVICE — HEX-LOCKING BLADE ELECTRODE: Brand: EDGE

## (undated) DEVICE — 1200 GUARD II KIT W/5MM TUBE W/O VAC TUBE: Brand: GUARDIAN

## (undated) DEVICE — PACK,ORTOHMAX/CVMAX,UNIVERSAL,5/CS: Brand: MEDLINE

## (undated) DEVICE — TUBING SUCTION UNIV 3/16 INX20 FT W/ SCALLOPED CONN STRL

## (undated) DEVICE — SUTURE VCRL SZ 2-0 L27IN ABSRB UD L36MM CP-1 1/2 CIR REV J266H

## (undated) DEVICE — HANDPIECE SET WITH BONE CLEANING TIP AND SUCTION TUBE: Brand: INTERPULSE

## (undated) DEVICE — TURNOVER KIT OR CUST CMB FLD GEN LF

## (undated) DEVICE — SUTURE ABSORBABLE BRAIDED 2-0 CT-1 27 IN UD VICRYL J259H

## (undated) DEVICE — PADDING CST 4INX4YD --

## (undated) DEVICE — INFECTION CONTROL KIT SYS

## (undated) DEVICE — SPONGE GZ W4XL4IN COT 12 PLY TYP VII WVN C FLD DSGN STERILE

## (undated) DEVICE — DRESSING FOAM POST OPERATIVE 4X10 IN MEPILEX BORDER AG

## (undated) DEVICE — 5.5 MM LONG ABRADER SHAVER BLADES                                    AND BURRS, POWER/EP-1, 18 CM WORKING                                    LENGTH,BLACK,PACKAGED 3 PER BOX, STERILE

## (undated) DEVICE — APPLICATOR BNDG 1MM ADH PREMIERPRO EXOFIN

## (undated) DEVICE — 6619 2 PTNT ISO SYS INCISE AREA&LT;(&GT;&&LT;)&GT;P: Brand: STERI-DRAPE™ IOBAN™ 2

## (undated) DEVICE — CATHETER ETER URET 5FR L70CM 0038IN FLX OPN TIP NO SIDE EYE

## (undated) DEVICE — 3M™ TEGADERM™ TRANSPARENT FILM DRESSING FRAME STYLE, 1626W, 4 IN X 4-3/4 IN (10 CM X 12 CM), 50/CT 4CT/CASE: Brand: 3M™ TEGADERM™

## (undated) DEVICE — 3M™ IOBAN™ 2 ANTIMICROBIAL INCISE DRAPE 6651EZ: Brand: IOBAN™ 2

## (undated) DEVICE — SUTURE VCRL SZ 3-0 L27IN ABSRB UD L24MM PS-1 3/8 CIR PRIM J936H

## (undated) DEVICE — SUTURE MCRYL SZ 3-0 L27IN ABSRB UD L19MM PS-2 3/8 CIR PRIM Y427H

## (undated) DEVICE — SUTURE PDS II SZ 0 L27IN ABSRB VLT L36MM CT-1 1/2 CIR Z340H

## (undated) DEVICE — SPONGE LAP 18X18IN STRL -- 5/PK

## (undated) DEVICE — NANOPASS REACH CRESCENT: Brand: NANOPASS

## (undated) DEVICE — ADHESIVE SKIN CLOSURE 4X44 CM PREMIERPRO EXOFINFUSION DISP

## (undated) DEVICE — DRSG PATCH ANTIMIC 1INX7.0MM -- CONVERT TO ITEM 356054

## (undated) DEVICE — PREP SKN PREVAIL 40ML APPL --

## (undated) DEVICE — PADDING CAST SPEC 6INX4YD COT --

## (undated) DEVICE — SOL IRR SOD CL 0.9% 3000ML --

## (undated) DEVICE — 3.5 MM STARVAC 90 INTEGRATED CABLE WAND ICW, 90 DEGREE: Brand: COBLATION

## (undated) DEVICE — SUTURE PROL SZ 0 L30IN NONABSORBABLE BLU L40MM CT 1/2 CIR 8434H

## (undated) DEVICE — 450 ML BOTTLE OF 0.05% CHLORHEXIDINE GLUCONATE IN 99.95% STERILE WATER FOR IRRIGATION, USP AND APPLICATOR.: Brand: IRRISEPT ANTIMICROBIAL WOUND LAVAGE

## (undated) DEVICE — GLOVE SURG SZ 8 L12IN FNGR THK94MIL STD WHT LTX FREE

## (undated) DEVICE — DRAPE C ARM W/ POLY STRP W42XL72IN FOR MOB XR

## (undated) DEVICE — SUTURE STRATAFIX SPRL SZ 1 L14IN ABSRB VLT L48CM CTX 1/2 SXPD2B405

## (undated) DEVICE — SCRUBIN SCRUB BRUSH DRY STER: Brand: MEDLINE INDUSTRIES, INC.

## (undated) DEVICE — 2108 SERIES SAGITTAL BLADE (18.6 X 0.8 X 73.8MM)

## (undated) DEVICE — SUTURE MCRYL SZ 3-0 L27IN ABSRB UD L24MM PS-1 3/8 CIR PRIM Y936H

## (undated) DEVICE — SOLUTION SURG PREP 26 CC PURPREP

## (undated) DEVICE — SPONGE GZ W4XL4IN COT 12 PLY TYP VII WVN C FLD DSGN

## (undated) DEVICE — DBD-PACK,LAPAROTOMY,2 REINFORCED GOWNS: Brand: MEDLINE